# Patient Record
Sex: MALE | Race: WHITE | NOT HISPANIC OR LATINO | Employment: UNEMPLOYED | URBAN - NONMETROPOLITAN AREA
[De-identification: names, ages, dates, MRNs, and addresses within clinical notes are randomized per-mention and may not be internally consistent; named-entity substitution may affect disease eponyms.]

---

## 2022-10-02 ENCOUNTER — HOSPITAL ENCOUNTER (INPATIENT)
Facility: HOSPITAL | Age: 42
LOS: 8 days | Discharge: HOME | End: 2022-10-10
Attending: PSYCHIATRY & NEUROLOGY | Admitting: PSYCHIATRY & NEUROLOGY

## 2022-10-02 ENCOUNTER — HOSPITAL ENCOUNTER (EMERGENCY)
Facility: HOSPITAL | Age: 42
Discharge: PSYCHIATRIC OR SUBSTANCE USE FACILITY | End: 2022-10-02
Attending: EMERGENCY MEDICINE | Admitting: EMERGENCY MEDICINE

## 2022-10-02 VITALS
DIASTOLIC BLOOD PRESSURE: 70 MMHG | WEIGHT: 185 LBS | SYSTOLIC BLOOD PRESSURE: 112 MMHG | RESPIRATION RATE: 18 BRPM | HEART RATE: 90 BPM | BODY MASS INDEX: 25.06 KG/M2 | OXYGEN SATURATION: 99 % | TEMPERATURE: 97.8 F | HEIGHT: 72 IN

## 2022-10-02 DIAGNOSIS — R45.89 SUICIDAL BEHAVIOR WITHOUT ATTEMPTED SELF-INJURY: Primary | ICD-10-CM

## 2022-10-02 PROBLEM — R45.851 DEPRESSION WITH SUICIDAL IDEATION: Status: ACTIVE | Noted: 2022-10-02

## 2022-10-02 PROBLEM — F32.A DEPRESSION WITH SUICIDAL IDEATION: Status: ACTIVE | Noted: 2022-10-02

## 2022-10-02 LAB
ALBUMIN SERPL-MCNC: 3.95 G/DL (ref 3.5–5.2)
ALBUMIN/GLOB SERPL: 1.5 G/DL
ALP SERPL-CCNC: 55 U/L (ref 39–117)
ALT SERPL W P-5'-P-CCNC: 97 U/L (ref 1–41)
AMPHET+METHAMPHET UR QL: NEGATIVE
AMPHETAMINES UR QL: NEGATIVE
ANION GAP SERPL CALCULATED.3IONS-SCNC: 9.5 MMOL/L (ref 5–15)
APAP SERPL-MCNC: <5 MCG/ML (ref 0–30)
AST SERPL-CCNC: 62 U/L (ref 1–40)
BACTERIA UR QL AUTO: ABNORMAL /HPF
BARBITURATES UR QL SCN: NEGATIVE
BASOPHILS # BLD AUTO: 0.09 10*3/MM3 (ref 0–0.2)
BASOPHILS NFR BLD AUTO: 0.7 % (ref 0–1.5)
BENZODIAZ UR QL SCN: NEGATIVE
BILIRUB SERPL-MCNC: <0.2 MG/DL (ref 0–1.2)
BILIRUB UR QL STRIP: NEGATIVE
BUN SERPL-MCNC: 21 MG/DL (ref 6–20)
BUN/CREAT SERPL: 24.4 (ref 7–25)
BUPRENORPHINE SERPL-MCNC: NEGATIVE NG/ML
CALCIUM SPEC-SCNC: 8.7 MG/DL (ref 8.6–10.5)
CANNABINOIDS SERPL QL: NEGATIVE
CHLORIDE SERPL-SCNC: 104 MMOL/L (ref 98–107)
CLARITY UR: CLEAR
CO2 SERPL-SCNC: 25.5 MMOL/L (ref 22–29)
COCAINE UR QL: NEGATIVE
COLOR UR: YELLOW
CREAT SERPL-MCNC: 0.86 MG/DL (ref 0.76–1.27)
DEPRECATED RDW RBC AUTO: 52.6 FL (ref 37–54)
EGFRCR SERPLBLD CKD-EPI 2021: 110.2 ML/MIN/1.73
EOSINOPHIL # BLD AUTO: 0.17 10*3/MM3 (ref 0–0.4)
EOSINOPHIL NFR BLD AUTO: 1.3 % (ref 0.3–6.2)
ERYTHROCYTE [DISTWIDTH] IN BLOOD BY AUTOMATED COUNT: 15.1 % (ref 12.3–15.4)
ETHANOL BLD-MCNC: <10 MG/DL (ref 0–10)
ETHANOL UR QL: <0.01 %
FLUAV RNA RESP QL NAA+PROBE: NOT DETECTED
FLUBV RNA RESP QL NAA+PROBE: NOT DETECTED
GLOBULIN UR ELPH-MCNC: 2.6 GM/DL
GLUCOSE SERPL-MCNC: 95 MG/DL (ref 65–99)
GLUCOSE UR STRIP-MCNC: NEGATIVE MG/DL
HCT VFR BLD AUTO: 40.2 % (ref 37.5–51)
HGB BLD-MCNC: 12.8 G/DL (ref 13–17.7)
HGB UR QL STRIP.AUTO: ABNORMAL
HYALINE CASTS UR QL AUTO: ABNORMAL /LPF
IMM GRANULOCYTES # BLD AUTO: 0.34 10*3/MM3 (ref 0–0.05)
IMM GRANULOCYTES NFR BLD AUTO: 2.7 % (ref 0–0.5)
KETONES UR QL STRIP: NEGATIVE
LEUKOCYTE ESTERASE UR QL STRIP.AUTO: NEGATIVE
LYMPHOCYTES # BLD AUTO: 3.56 10*3/MM3 (ref 0.7–3.1)
LYMPHOCYTES NFR BLD AUTO: 28 % (ref 19.6–45.3)
MAGNESIUM SERPL-MCNC: 2.2 MG/DL (ref 1.6–2.6)
MCH RBC QN AUTO: 30.8 PG (ref 26.6–33)
MCHC RBC AUTO-ENTMCNC: 31.8 G/DL (ref 31.5–35.7)
MCV RBC AUTO: 96.9 FL (ref 79–97)
METHADONE UR QL SCN: NEGATIVE
MONOCYTES # BLD AUTO: 0.99 10*3/MM3 (ref 0.1–0.9)
MONOCYTES NFR BLD AUTO: 7.8 % (ref 5–12)
NEUTROPHILS NFR BLD AUTO: 59.5 % (ref 42.7–76)
NEUTROPHILS NFR BLD AUTO: 7.56 10*3/MM3 (ref 1.7–7)
NITRITE UR QL STRIP: NEGATIVE
NRBC BLD AUTO-RTO: 0 /100 WBC (ref 0–0.2)
OPIATES UR QL: NEGATIVE
OXYCODONE UR QL SCN: NEGATIVE
PCP UR QL SCN: NEGATIVE
PH UR STRIP.AUTO: 7 [PH] (ref 5–8)
PLATELET # BLD AUTO: 297 10*3/MM3 (ref 140–450)
PMV BLD AUTO: 9.1 FL (ref 6–12)
POTASSIUM SERPL-SCNC: 4.5 MMOL/L (ref 3.5–5.2)
PROPOXYPH UR QL: NEGATIVE
PROT SERPL-MCNC: 6.5 G/DL (ref 6–8.5)
PROT UR QL STRIP: NEGATIVE
RBC # BLD AUTO: 4.15 10*6/MM3 (ref 4.14–5.8)
RBC # UR STRIP: ABNORMAL /HPF
REF LAB TEST METHOD: ABNORMAL
SALICYLATES SERPL-MCNC: 2.2 MG/DL
SARS-COV-2 RNA RESP QL NAA+PROBE: NOT DETECTED
SODIUM SERPL-SCNC: 139 MMOL/L (ref 136–145)
SP GR UR STRIP: 1.01 (ref 1–1.03)
SQUAMOUS #/AREA URNS HPF: ABNORMAL /HPF
TRICYCLICS UR QL SCN: NEGATIVE
UROBILINOGEN UR QL STRIP: ABNORMAL
WBC # UR STRIP: ABNORMAL /HPF
WBC NRBC COR # BLD: 12.71 10*3/MM3 (ref 3.4–10.8)

## 2022-10-02 PROCEDURE — C9803 HOPD COVID-19 SPEC COLLECT: HCPCS

## 2022-10-02 PROCEDURE — 36415 COLL VENOUS BLD VENIPUNCTURE: CPT

## 2022-10-02 PROCEDURE — 82077 ASSAY SPEC XCP UR&BREATH IA: CPT | Performed by: EMERGENCY MEDICINE

## 2022-10-02 PROCEDURE — 85025 COMPLETE CBC W/AUTO DIFF WBC: CPT | Performed by: EMERGENCY MEDICINE

## 2022-10-02 PROCEDURE — 80143 DRUG ASSAY ACETAMINOPHEN: CPT | Performed by: EMERGENCY MEDICINE

## 2022-10-02 PROCEDURE — 83735 ASSAY OF MAGNESIUM: CPT | Performed by: EMERGENCY MEDICINE

## 2022-10-02 PROCEDURE — 80306 DRUG TEST PRSMV INSTRMNT: CPT | Performed by: EMERGENCY MEDICINE

## 2022-10-02 PROCEDURE — 81001 URINALYSIS AUTO W/SCOPE: CPT | Performed by: EMERGENCY MEDICINE

## 2022-10-02 PROCEDURE — 87636 SARSCOV2 & INF A&B AMP PRB: CPT | Performed by: EMERGENCY MEDICINE

## 2022-10-02 PROCEDURE — 93005 ELECTROCARDIOGRAM TRACING: CPT | Performed by: PSYCHIATRY & NEUROLOGY

## 2022-10-02 PROCEDURE — 99284 EMERGENCY DEPT VISIT MOD MDM: CPT

## 2022-10-02 PROCEDURE — 80179 DRUG ASSAY SALICYLATE: CPT | Performed by: EMERGENCY MEDICINE

## 2022-10-02 PROCEDURE — 80053 COMPREHEN METABOLIC PANEL: CPT | Performed by: EMERGENCY MEDICINE

## 2022-10-02 RX ORDER — BENZTROPINE MESYLATE 1 MG/1
2 TABLET ORAL ONCE AS NEEDED
Status: DISCONTINUED | OUTPATIENT
Start: 2022-10-02 | End: 2022-10-10 | Stop reason: HOSPADM

## 2022-10-02 RX ORDER — ONDANSETRON 4 MG/1
4 TABLET, FILM COATED ORAL EVERY 6 HOURS PRN
Status: DISCONTINUED | OUTPATIENT
Start: 2022-10-02 | End: 2022-10-10 | Stop reason: HOSPADM

## 2022-10-02 RX ORDER — LOPERAMIDE HYDROCHLORIDE 2 MG/1
2 CAPSULE ORAL
Status: DISCONTINUED | OUTPATIENT
Start: 2022-10-02 | End: 2022-10-10 | Stop reason: HOSPADM

## 2022-10-02 RX ORDER — BENZTROPINE MESYLATE 1 MG/ML
1 INJECTION INTRAMUSCULAR; INTRAVENOUS ONCE AS NEEDED
Status: DISCONTINUED | OUTPATIENT
Start: 2022-10-02 | End: 2022-10-10 | Stop reason: HOSPADM

## 2022-10-02 RX ORDER — TRAZODONE HYDROCHLORIDE 50 MG/1
50 TABLET ORAL NIGHTLY PRN
Status: DISCONTINUED | OUTPATIENT
Start: 2022-10-02 | End: 2022-10-03

## 2022-10-02 RX ORDER — HYDROXYZINE 50 MG/1
50 TABLET, FILM COATED ORAL EVERY 6 HOURS PRN
Status: DISCONTINUED | OUTPATIENT
Start: 2022-10-02 | End: 2022-10-10 | Stop reason: HOSPADM

## 2022-10-02 RX ORDER — NICOTINE 21 MG/24HR
1 PATCH, TRANSDERMAL 24 HOURS TRANSDERMAL
Status: DISCONTINUED | OUTPATIENT
Start: 2022-10-03 | End: 2022-10-10 | Stop reason: HOSPADM

## 2022-10-02 RX ORDER — ECHINACEA PURPUREA EXTRACT 125 MG
2 TABLET ORAL AS NEEDED
Status: DISCONTINUED | OUTPATIENT
Start: 2022-10-02 | End: 2022-10-10 | Stop reason: HOSPADM

## 2022-10-02 RX ORDER — ALUMINA, MAGNESIA, AND SIMETHICONE 2400; 2400; 240 MG/30ML; MG/30ML; MG/30ML
15 SUSPENSION ORAL EVERY 6 HOURS PRN
Status: DISCONTINUED | OUTPATIENT
Start: 2022-10-02 | End: 2022-10-10 | Stop reason: HOSPADM

## 2022-10-02 RX ORDER — BENZONATATE 100 MG/1
100 CAPSULE ORAL 3 TIMES DAILY PRN
Status: DISCONTINUED | OUTPATIENT
Start: 2022-10-02 | End: 2022-10-10 | Stop reason: HOSPADM

## 2022-10-02 RX ORDER — FAMOTIDINE 20 MG/1
20 TABLET, FILM COATED ORAL 2 TIMES DAILY PRN
Status: DISCONTINUED | OUTPATIENT
Start: 2022-10-02 | End: 2022-10-10 | Stop reason: HOSPADM

## 2022-10-02 RX ORDER — IBUPROFEN 400 MG/1
400 TABLET ORAL EVERY 6 HOURS PRN
Status: DISCONTINUED | OUTPATIENT
Start: 2022-10-02 | End: 2022-10-10 | Stop reason: HOSPADM

## 2022-10-03 LAB
ALBUMIN SERPL-MCNC: 3.51 G/DL (ref 3.5–5.2)
ALBUMIN/GLOB SERPL: 1.4 G/DL
ALP SERPL-CCNC: 52 U/L (ref 39–117)
ALT SERPL W P-5'-P-CCNC: 89 U/L (ref 1–41)
ANION GAP SERPL CALCULATED.3IONS-SCNC: 10.2 MMOL/L (ref 5–15)
AST SERPL-CCNC: 57 U/L (ref 1–40)
BASOPHILS # BLD AUTO: 0.08 10*3/MM3 (ref 0–0.2)
BASOPHILS NFR BLD AUTO: 0.7 % (ref 0–1.5)
BILIRUB SERPL-MCNC: <0.2 MG/DL (ref 0–1.2)
BUN SERPL-MCNC: 22 MG/DL (ref 6–20)
BUN/CREAT SERPL: 22.9 (ref 7–25)
CALCIUM SPEC-SCNC: 8.8 MG/DL (ref 8.6–10.5)
CHLORIDE SERPL-SCNC: 106 MMOL/L (ref 98–107)
CO2 SERPL-SCNC: 23.8 MMOL/L (ref 22–29)
CREAT SERPL-MCNC: 0.96 MG/DL (ref 0.76–1.27)
DEPRECATED RDW RBC AUTO: 54.9 FL (ref 37–54)
EGFRCR SERPLBLD CKD-EPI 2021: 100.6 ML/MIN/1.73
EOSINOPHIL # BLD AUTO: 0.19 10*3/MM3 (ref 0–0.4)
EOSINOPHIL NFR BLD AUTO: 1.6 % (ref 0.3–6.2)
ERYTHROCYTE [DISTWIDTH] IN BLOOD BY AUTOMATED COUNT: 15.4 % (ref 12.3–15.4)
GLOBULIN UR ELPH-MCNC: 2.6 GM/DL
GLUCOSE SERPL-MCNC: 108 MG/DL (ref 65–99)
HCT VFR BLD AUTO: 40.1 % (ref 37.5–51)
HGB BLD-MCNC: 12.5 G/DL (ref 13–17.7)
IMM GRANULOCYTES # BLD AUTO: 0.28 10*3/MM3 (ref 0–0.05)
IMM GRANULOCYTES NFR BLD AUTO: 2.4 % (ref 0–0.5)
LYMPHOCYTES # BLD AUTO: 3.56 10*3/MM3 (ref 0.7–3.1)
LYMPHOCYTES NFR BLD AUTO: 30 % (ref 19.6–45.3)
MCH RBC QN AUTO: 30.8 PG (ref 26.6–33)
MCHC RBC AUTO-ENTMCNC: 31.2 G/DL (ref 31.5–35.7)
MCV RBC AUTO: 98.8 FL (ref 79–97)
MONOCYTES # BLD AUTO: 1.07 10*3/MM3 (ref 0.1–0.9)
MONOCYTES NFR BLD AUTO: 9 % (ref 5–12)
NEUTROPHILS NFR BLD AUTO: 56.3 % (ref 42.7–76)
NEUTROPHILS NFR BLD AUTO: 6.7 10*3/MM3 (ref 1.7–7)
NRBC BLD AUTO-RTO: 0 /100 WBC (ref 0–0.2)
PLATELET # BLD AUTO: 282 10*3/MM3 (ref 140–450)
PMV BLD AUTO: 9.4 FL (ref 6–12)
POTASSIUM SERPL-SCNC: 4.5 MMOL/L (ref 3.5–5.2)
PROT SERPL-MCNC: 6.1 G/DL (ref 6–8.5)
RBC # BLD AUTO: 4.06 10*6/MM3 (ref 4.14–5.8)
SODIUM SERPL-SCNC: 140 MMOL/L (ref 136–145)
WBC NRBC COR # BLD: 11.88 10*3/MM3 (ref 3.4–10.8)

## 2022-10-03 PROCEDURE — 80053 COMPREHEN METABOLIC PANEL: CPT | Performed by: PSYCHIATRY & NEUROLOGY

## 2022-10-03 PROCEDURE — 99223 1ST HOSP IP/OBS HIGH 75: CPT | Performed by: PSYCHIATRY & NEUROLOGY

## 2022-10-03 PROCEDURE — 85025 COMPLETE CBC W/AUTO DIFF WBC: CPT | Performed by: PSYCHIATRY & NEUROLOGY

## 2022-10-03 PROCEDURE — 93010 ELECTROCARDIOGRAM REPORT: CPT | Performed by: INTERNAL MEDICINE

## 2022-10-03 RX ORDER — TRAZODONE HYDROCHLORIDE 50 MG/1
100 TABLET ORAL NIGHTLY PRN
Status: DISCONTINUED | OUTPATIENT
Start: 2022-10-03 | End: 2022-10-05

## 2022-10-03 RX ORDER — LURASIDONE HYDROCHLORIDE 40 MG/1
40 TABLET, FILM COATED ORAL
Status: DISCONTINUED | OUTPATIENT
Start: 2022-10-03 | End: 2022-10-10 | Stop reason: HOSPADM

## 2022-10-03 RX ADMIN — HYDROXYZINE HYDROCHLORIDE 50 MG: 50 TABLET, FILM COATED ORAL at 00:17

## 2022-10-03 RX ADMIN — LURASIDONE HYDROCHLORIDE 40 MG: 40 TABLET, FILM COATED ORAL at 17:01

## 2022-10-03 RX ADMIN — TRAZODONE HYDROCHLORIDE 50 MG: 50 TABLET ORAL at 00:17

## 2022-10-03 RX ADMIN — TRAZODONE HYDROCHLORIDE 100 MG: 50 TABLET ORAL at 21:43

## 2022-10-03 RX ADMIN — NICOTINE TRANSDERMAL SYSTEM 1 PATCH: 21 PATCH, EXTENDED RELEASE TRANSDERMAL at 10:27
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
## 2022-10-03 NOTE — PLAN OF CARE
Problem: Adult Behavioral Health Plan of Care  Goal: Plan of Care Review  Outcome: Ongoing, Progressing  Flowsheets  Taken 10/3/2022 1338 by Justin Vazquez, RN  Progress: improving  Outcome Evaluation: PATIENT VERBALIZES SEVERE ANXIETY AND DEPRESSION, SI WITH NO PLAN. PATIENT IS AOX3 AND COOPERATIVE WITH NO S/S OF ACUTE DISTRESS NOTED. PATIENT IS EATING AND DRINKING WELL, POOR SLEEP.  Taken 10/3/2022 1020 by Niurka Fonseca  Plan of Care Reviewed With: patient  Patient Agreement with Plan of Care: agrees  Taken 10/3/2022 0847 by Justin Vazquez, RN  Plan of Care Reviewed With: patient  Patient Agreement with Plan of Care: agrees   Goal Outcome Evaluation:  Plan of Care Reviewed With: patient  Patient Agreement with Plan of Care: agrees     Progress: improving  Outcome Evaluation: PATIENT VERBALIZES SEVERE ANXIETY AND DEPRESSION, SI WITH NO PLAN. PATIENT IS AOX3 AND COOPERATIVE WITH NO S/S OF ACUTE DISTRESS NOTED. PATIENT IS EATING AND DRINKING WELL, POOR SLEEP.

## 2022-10-03 NOTE — NURSING NOTE
Presented pt to Dr. De Leon new order to admit SP3 routine orders to AE    Repeat CBC, and CMP in AM

## 2022-10-03 NOTE — PLAN OF CARE
Problem: Adult Behavioral Health Plan of Care  Goal: Plan of Care Review  Outcome: Ongoing, Not Progressing  Flowsheets (Taken 10/3/2022 0013)  Progress: no change  Plan of Care Reviewed With: patient  Patient Agreement with Plan of Care: agrees  Outcome Evaluation: new admit   Goal Outcome Evaluation:  Plan of Care Reviewed With: patient  Patient Agreement with Plan of Care: agrees     Progress: no change  Outcome Evaluation: new admit
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
## 2022-10-03 NOTE — H&P
"                                                        Psychiatry Inpatient Initial Eval (H+P)    Clinician: Chinmay Nation MD      CC:SI    HPI:   Patient was admitted on the unit on 10/2/2022 and was evaluated on 10/03/22   42 y.o. male presented to Ed reporting worsening symptoms of depression and anxiety. Pt states he has nothing to live for that ever since he got out of nursing home that none of his family will help him. He states that he is suicidal \"any way he can do it.\" he has had thoughts to shoot himself, run in front of traffic, cut himself, and shoot up drugs.\" Pt rates anxiety 10/10, and depression 10/10. Denies HI/AVH.    Available medical/psychiatric records reviewed and incorporated into the current document.   Patient present with a high degree of irritability, dysphoria, and depressed mood.        Past Psychiatric History: X1 prior admission at the [facility] in 2013 according to patient's self report.  He reports multiple other admission at outside facilties, including [facility].    Denies current outpatient care.  Screening questions reveal a h/o episodes of increased energy, possible euphoria, decreased need for sleep.    Substance Abuse History: h/o alcohol, xanax, MJ.  Patient doesn't want to elaborate. He says he has not used recently.    Social History:  Currently homeless. Unemployed.  Grew up in [place], [place].  Currently estranged from family.  Recently released from nursing home after 8.5 years.     Family History:  Patient doesn't want to elaborate, other than to state \"it [mental illness] runs on both sides of my family.\"    No Known Allergies     Past Medical History:   Diagnosis Date   • Anxiety    • Bipolar disorder (HCC)    • Depression    • Hepatitis C        Prior to Admission medications    Not on File        Temp:  [97.6 °F (36.4 °C)-98.2 °F (36.8 °C)] 98.2 °F (36.8 °C)  Heart Rate:  [88-97] 88  Resp:  [18] 18  BP: ()/(59-70) 96/59      :  Mental Status Exam  Mood: " dysphoric  Affect: dysphoric   Thought Processes: linear, logical, and goal directed  Thought Content: negativistic  Hallucinations: no  Suicidal Thoughts: slight  Suicidal Plan/Intent:denies  Hopelesness:Moderate  Homicidal Thoughts:  absent  Review of Systems    General ROS: negative for - fever or malaise  Endocrine ROS: negative for - palpitations  Respiratory ROS: no cough, shortness of breath, or wheezing  Cardiovascular ROS: no chest pain or dyspnea on exertion  Gastrointestinal ROS: no abdominal pain,no black or bloody stools  Neurologic: no dizziness.  No vision changes.  Denies headache.  Skin: denies rash or lesions.  Neuro: negative    Physical Exam:  General Appearance:    Alert, cooperative, in no acute distress   Head:    Normocephalic, without obvious abnormality, atraumatic   Eyes:            Lids and lashes normal, conjunctivae and sclerae normal, no   icterus, no pallor, corneas clear, PERRLA   Ears:    Ears appear intact with no abnormalities noted   Throat:   No oral lesions, no thrush, oral mucosa moist   Neck:   No adenopathy, supple, trachea midline, no thyromegaly, no     carotid bruit, no JVD   Back:     No kyphosis present, no scoliosis present, no skin lesions,       erythema or scars, no tenderness to percussion or                   palpation,   range of motion normal   Lungs:     Clear to auscultation,respirations regular, even and                   unlabored    Heart:    Regular rhythm and normal rate, normal S1 and S2, no            murmur, no gallop, no rub, no click   Breast Exam:    Deferred   Abdomen:     Normal bowel sounds, no masses, no organomegaly, soft, nontender, nondistended, no guarding, no rebound tenderness   Genitalia:    Deferred   Extremities:   Moves all extremities well, no edema, no cyanosis, no              redness   Pulses:   Pulses palpable and equal bilaterally   Skin:   No bleeding, bruising or rash   Lymph nodes:   No palpable adenopathy   Neurologic:    Cranial nerves 2 - 12 grossly intact, sensation intact, DTR        present and equal bilaterally     Exam completed within view of nursing staff.        Labs:  Lab Results (all)     Procedure Component Value Units Date/Time    Comprehensive Metabolic Panel [[id number]]  (Abnormal) Collected: 10/03/22 0445    Specimen: Blood Updated: 10/03/22 0531     Glucose 108 mg/dL      BUN 22 mg/dL      Creatinine 0.96 mg/dL      Sodium 140 mmol/L      Potassium 4.5 mmol/L      Comment: Slight hemolysis detected by analyzer. Results may be affected.        Chloride 106 mmol/L      CO2 23.8 mmol/L      Calcium 8.8 mg/dL      Total Protein 6.1 g/dL      Albumin 3.51 g/dL      ALT (SGPT) 89 U/L      AST (SGOT) 57 U/L      Alkaline Phosphatase 52 U/L      Total Bilirubin <0.2 mg/dL      Globulin 2.6 gm/dL      A/G Ratio 1.4 g/dL      BUN/Creatinine Ratio 22.9     Anion Gap 10.2 mmol/L      eGFR 100.6 mL/min/1.73      Comment: National Kidney Foundation and American Society of Nephrology (ASN) Task Force recommended calculation based on the Chronic Kidney Disease Epidemiology Collaboration (CKD-EPI) equation refit without adjustment for race.       Narrative:      GFR Normal >60  Chronic Kidney Disease <60  Kidney Failure <15      CBC & Differential [[id number]]  (Abnormal) Collected: 10/03/22 0445    Specimen: Blood Updated: 10/03/22 0506    Narrative:      The following orders were created for panel order CBC & Differential.  Procedure                               Abnormality         Status                     ---------                               -----------         ------                     CBC Auto Differential[[id number]]        Abnormal            Final result                 Please view results for these tests on the individual orders.    CBC Auto Differential [[id number]]  (Abnormal) Collected: 10/03/22 0445    Specimen: Blood Updated: 10/03/22 0506     WBC 11.88 10*3/mm3      RBC 4.06 10*6/mm3      Hemoglobin 12.5 g/dL       Hematocrit 40.1 %      MCV 98.8 fL      MCH 30.8 pg      MCHC 31.2 g/dL      RDW 15.4 %      RDW-SD 54.9 fl      MPV 9.4 fL      Platelets 282 10*3/mm3      Neutrophil % 56.3 %      Lymphocyte % 30.0 %      Monocyte % 9.0 %      Eosinophil % 1.6 %      Basophil % 0.7 %      Immature Grans % 2.4 %      Neutrophils, Absolute 6.70 10*3/mm3      Lymphocytes, Absolute 3.56 10*3/mm3      Monocytes, Absolute 1.07 10*3/mm3      Eosinophils, Absolute 0.19 10*3/mm3      Basophils, Absolute 0.08 10*3/mm3      Immature Grans, Absolute 0.28 10*3/mm3      nRBC 0.0 /100 WBC           Imaging:  Imaging Results (All)     None            Diagnosis:  Bipolar II Disorder, MRE depressed with mixed features    Given the high risk and/or potentially life-threatening nature of patient's presenting symptoms, patient has been admitted for safety and stabilization and placed on the appropriate level of precautions.  Patient will be assigned a Masters level therapist and encouraged to participate in both individual and group therapy on the unit.  Routine labs have been ordered.    CODE STATUS: Full Code     Start trial of latuda 40mg Daily.      Hepatitis C  - Followup as an outpatient      I have discussed with the patient the risks, benefits, side effects, and treatment alternatives to the patient's psychiatric medications. Patient has also been instructed regarding the risks versus benefits of no treatment and also the fact that treatment does not guarantee results.  Patient voices an understanding of this and accepts the risks associated with the use of this medication.   Patient is instructed to review the medication information packets provided by the pharmacy and to call me with any questions or concerns related to the medication.  Patient agrees to notify all of their prescribers of the recent medication change, and to notify me immediately if prescribed any other medication by another provider, so as to allow me to verify that the  medications I am prescribing do not interfere with the newly prescribed medication.     Further treatment and disposition planning will be developed prospectively.
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
## 2022-10-03 NOTE — PLAN OF CARE
Problem: Adult Behavioral Health Plan of Care  Goal: Plan of Care Review  Outcome: Ongoing, Progressing  Flowsheets (Taken 10/3/2022 1020)  Consent Given to Review Plan with: Declines  Progress: no change  Plan of Care Reviewed With: patient  Patient Agreement with Plan of Care: agrees  Outcome Evaluation: New admit. Initiated social history and integrated summary  Goal: Patient-Specific Goal (Individualization)  Outcome: Ongoing, Progressing  Flowsheets (Taken 10/3/2022 1020)  Patient Personal Strengths:   resilient   resourceful  Patient-Specific Goals (Include Timeframe): Patient will deny SI/HI prior to discharge. Patient will identify 1 healthy coping skill for stress prior to discharge. Patient will consent to appropriate aftercare plan prior to discharge.  Individualized Care Needs: Therapist will offer 1-4 individual sessions, family education, aftercare planning  Anxieties, Fears or Concerns: None identified  Patient Vulnerabilities:   history of unsuccessful treatment   family/relationship conflict   housing insecurity   food insecurity  Goal: Optimized Coping Skills in Response to Life Stressors  Outcome: Ongoing, Progressing  Intervention: Promote Effective Coping Strategies  Flowsheets (Taken 10/3/2022 1020)  Supportive Measures:   active listening utilized   counseling provided  Goal: Develops/Participates in Therapeutic [place] to Support Successful Transition  Outcome: Ongoing, Progressing  Intervention: Mutually Develop Transition Plan  Flowsheets  Taken 10/3/2022 1020  Outpatient/Agency/Support Group Needs:   outpatient medication management   outpatient counseling   outpatient psychiatric care (specify)   case management  Anticipated Discharge Disposition: (to be determined) other (see comments)  Taken 10/3/2022 1017  Discharge Coordination/Progress: Patient has [facility] insurance for discharge planning. Patient refuses to participate in discharge planning this date.  Concerns Comments:  NA  Transportation Anticipated: public transportation  Transportation Concerns: no car  Current Discharge Risk: psychiatric illness  Concerns to be Addressed:   mental health   compliance issue   coping/stress   discharge planning   home safety   suicidal  Readmission Within the Last 30 Days: no previous admission in last 30 days  Patient/Family Anticipated Services at Transition:   outpatient care   mental health services     Patient's Choice of Community Agency(s): to be determined  Patient/Family Anticipates Transition to: (to be determined) other (see comments)  Offered/Gave Vendor List: no   Goal Outcome Evaluation:  Plan of Care Reviewed With: patient  Patient Agreement with Plan of Care: agrees  Consent Given to Review Plan with: Declines  Progress: no change  Outcome Evaluation: New admit. Initiated social history and integrated summary

## 2022-10-03 NOTE — ED TRIAGE NOTES
MEDICAL SCREENING:    Reason for Visit: Suicidal    Patient initially seen in triage.  The patient was advised further evaluation and diagnostic testing will be needed, some of the treatment and testing will be initiated in the lobby in order to begin the process.  The patient will be returned to the waiting area for the time being and possibly be re-assessed by a subsequent ED provider.  The patient will be brought back to the treatment area in as timely manner as possible.

## 2022-10-04 LAB
QT INTERVAL: 344 MS
QTC INTERVAL: 425 MS

## 2022-10-04 PROCEDURE — 99232 SBSQ HOSP IP/OBS MODERATE 35: CPT | Performed by: PSYCHIATRY & NEUROLOGY

## 2022-10-04 RX ADMIN — NICOTINE TRANSDERMAL SYSTEM 1 PATCH: 21 PATCH, EXTENDED RELEASE TRANSDERMAL at 10:16

## 2022-10-04 RX ADMIN — LURASIDONE HYDROCHLORIDE 40 MG: 40 TABLET, FILM COATED ORAL at 17:10

## 2022-10-04 RX ADMIN — HYDROXYZINE HYDROCHLORIDE 50 MG: 50 TABLET, FILM COATED ORAL at 20:59

## 2022-10-04 RX ADMIN — TRAZODONE HYDROCHLORIDE 100 MG: 50 TABLET ORAL at 21:00

## 2022-10-04 NOTE — PROGRESS NOTES
" Inpatient Psych Progress Note     Clinician: Chinmay Nation MD  Admission Date: 10/2/2022  10:51 EDT 10/04/22    Behavioral Health Treatment Plan and Problem List: I have reviewed and approved the Behavioral Health Treatment Plan and Problem list.    Allergies  No Known Allergies    Hospital Day: 2 days      Assessment completed within view of staff    History  CC/clinical focus: SI    Interval HPI: Patient seen and evaluated by me.  Chart reviewed. Patient continues to voice c/o severe depressed mood.  He is quite dysphoric and irritable.  Rates depression at a 8/10.   Med Compliant.  ROS otherwise as below.      Interval Review of Systems:   General ROS: negative for - fever or malaise  Endocrine ROS: negative for - palpitations  Respiratory ROS: no cough, shortness of breath, or wheezing  Cardiovascular ROS: no chest pain or dyspnea on exertion  Gastrointestinal ROS: no abdominal pain,no black or bloody stools    /61 (BP Location: Right arm, Patient Position: Sitting)   Pulse 83   Temp 98 °F (36.7 °C) (Temporal)   Resp 18   Ht 182.9 cm (72\")   Wt 70.5 kg (155 lb 6.4 oz)   SpO2 98%   BMI 21.08 kg/m²     Mental Status Exam  Mood: depressed  Affect: mood-congruent   Thought Processes: linear  Thought Content: negativistic  Hallucinations: no  Suicidal Thoughts: denies  Suicidal Plan/Intent: denies  Hopelesness:Severe  Homicidal Thoughts:  denies      Medical Decision Making:   Labs:     Lab Results (last 24 hours)     ** No results found for the last 24 hours. **            Radiology:     Imaging Results (Last 24 Hours)     ** No results found for the last 24 hours. **            EKG:     ECG/EMG Results (most recent)     Procedure Component Value Units Date/Time    ECG 12 Lead [[id number]] Collected: 10/03/22 0146     Updated: 10/04/22 0911     QT Interval 344 ms      QTC Interval 425 ms     Narrative:      Test Reason : Baseline Cardiac Status  Blood Pressure :   */*   mmHG  Vent. Rate :  92 BPM     " Atrial Rate :  92 BPM     P-R Int : 104 ms          QRS Dur :  80 ms      QT Int : 344 ms       P-R-T Axes :  73  76  77 degrees     QTc Int : 425 ms    Sinus rhythm with short MS  Otherwise normal ECG  When compared with ECG of 03-OCT-2022 01:46, (Unconfirmed)  Previous ECG has undetermined rhythm, needs review  Confirmed by Jagdeep Serrano ([id number]) on 10/4/2022 9:07:29 AM    Referred By: KAREN           Confirmed By: Jagdeep Serrano           Medications:  lurasidone, 40 mg, Oral, Daily With Dinner  nicotine, 1 patch, Transdermal, Q24H           All medications reviewed.      Assessment and Plan:      Bipolar II Disorder, MRE depressed with mixed features   Continue latuda 40mg Daily.        Hepatitis C  - Followup as an outpatient    Elevated LFTs  - Reviewed AM CMP, LFT's trending down.  Outpatient followup.     Continue hospitalization for safety and stabilization.  Continue current level of Special Precautions (q15 minute checks).

## 2022-10-04 NOTE — PLAN OF CARE
"  Problem: Adult Behavioral Health Plan of Care  Goal: Develops/Participates in Therapeutic [place] to Support Successful Transition  Outcome: Ongoing, Progressing  Intervention: Mutually Develop Transition Plan  Flowsheets (Taken 10/4/2022 1005)  Transition Support: follow-up care discussed       1005:     Therapist attempted to meet 1-1 with patient again for session including social history, aftercare planning, safety planning etc. Patient covers his head with his arm and states, \"I don't feel like this. I don't want to talk and I don't know what I'm gonna do.\"  Therapist provided emotional support and educated him about therapist's role. Encouraged him to meet with therapist asap to work on planning.  Patient refused and stated to come back tomorrow. Therapist has staffed with Dr. Chinmay Nation and updated him about patient's non-cooperation.  Patient isolates to room and he has been encouraged to comply with treatment planning and to attend daily groups.   "

## 2022-10-04 NOTE — PLAN OF CARE
Problem: Adult Behavioral Health Plan of Care  Goal: Plan of Care Review  Outcome: Ongoing, Progressing  Flowsheets  Taken 10/4/2022 1411 by Justin Vazquez, RN  Outcome Evaluation: PATIENT IS AOX3, COOPERATIVE WITH NO S/S OF DISTRESS NOTED.  Taken 10/4/2022 0808 by Justin Vazquez, RN  Plan of Care Reviewed With: patient  Patient Agreement with Plan of Care: agrees  Taken 10/4/2022 0433 by Sweetie Wilson RN  Progress: improving   Goal Outcome Evaluation:  Plan of Care Reviewed With: patient  Patient Agreement with Plan of Care: agrees     Progress: improving  Outcome Evaluation: PATIENT IS AOX3, COOPERATIVE WITH NO S/S OF DISTRESS NOTED.

## 2022-10-04 NOTE — PLAN OF CARE
Goal Outcome Evaluation:  Plan of Care Reviewed With: patient  Patient Agreement with Plan of Care: agrees     Progress: improving  Outcome Evaluation: Pt stated he was eating good but was having trouble sleeping thru the night. Pt rated anxiety a 5/10 and depression an 8/10. Pt stated he felt the lowest of the low. Pt had no si or hi or avh.

## 2022-10-04 NOTE — PHARMACY PATIENT ASSISTANCE
Pharmacy checked on price of Latuda 40 mg initiated inpatient. Per patient's plan, copay will be $0 for 1 month supply. No other issues identified at this time.    Thank you,    Nadira Briceño, PharmD  10/04/22  15:25 EDT

## 2022-10-05 PROCEDURE — 99232 SBSQ HOSP IP/OBS MODERATE 35: CPT | Performed by: PSYCHIATRY & NEUROLOGY

## 2022-10-05 RX ORDER — TRAZODONE HYDROCHLORIDE 50 MG/1
150 TABLET ORAL NIGHTLY PRN
Status: DISCONTINUED | OUTPATIENT
Start: 2022-10-05 | End: 2022-10-06

## 2022-10-05 RX ADMIN — IBUPROFEN 400 MG: 400 TABLET, FILM COATED ORAL at 20:37

## 2022-10-05 RX ADMIN — LURASIDONE HYDROCHLORIDE 40 MG: 40 TABLET, FILM COATED ORAL at 17:07

## 2022-10-05 RX ADMIN — HYDROXYZINE HYDROCHLORIDE 50 MG: 50 TABLET, FILM COATED ORAL at 20:37

## 2022-10-05 RX ADMIN — TRAZODONE HYDROCHLORIDE 150 MG: 50 TABLET ORAL at 20:37

## 2022-10-05 NOTE — PROGRESS NOTES
" Inpatient Psych Progress Note     Clinician: Chinmay Nation MD  Admission Date: 10/2/2022  14:10 EDT 10/05/22    Behavioral Health Treatment Plan and Problem List: I have reviewed and approved the Behavioral Health Treatment Plan and Problem list.    Allergies  No Known Allergies    Hospital Day: 3 days      Assessment completed within view of staff    History  CC/clinical focus: SI    Interval HPI: Patient seen and evaluated by me.  Chart reviewed. Patient states \"I am still feeling really hopeless.\"  Depressive symptoms still quite severe.  Tends to externalize blame.  C/o insomnia  Med Compliant.  ROS otherwise as below.      Interval Review of Systems:   General ROS: negative for - fever or malaise  Endocrine ROS: negative for - palpitations  Respiratory ROS: no cough, shortness of breath, or wheezing  Cardiovascular ROS: no chest pain or dyspnea on exertion  Gastrointestinal ROS: no abdominal pain,no black or bloody stools    BP 98/58 (BP Location: Right arm, Patient Position: Sitting)   Pulse 78   Temp 97 °F (36.1 °C) (Temporal)   Resp 18   Ht 182.9 cm (72\")   Wt 70.5 kg (155 lb 6.4 oz)   SpO2 98%   BMI 21.08 kg/m²     Mental Status Exam  Mood: depressed  Affect: dysphoric   Thought Processes: linear  Thought Content: negativistic  Hallucinations: no  Suicidal Thoughts:   Suicidal Plan/Intent: denies  Hopelesness:Moderate  Homicidal Thoughts:  denies      Medical Decision Making:   Labs:     Lab Results (last 24 hours)     ** No results found for the last 24 hours. **            Radiology:     Imaging Results (Last 24 Hours)     ** No results found for the last 24 hours. **            EKG:     ECG/EMG Results (most recent)     Procedure Component Value Units Date/Time    ECG 12 Lead [[id number]] Collected: 10/03/22 0146     Updated: 10/04/22 0911     QT Interval 344 ms      QTC Interval 425 ms     Narrative:      Test Reason : Baseline Cardiac Status  Blood Pressure :   */*   mmHG  Vent. Rate :  92 BPM  "    Atrial Rate :  92 BPM     P-R Int : 104 ms          QRS Dur :  80 ms      QT Int : 344 ms       P-R-T Axes :  73  76  77 degrees     QTc Int : 425 ms    Sinus rhythm with short PA  Otherwise normal ECG  When compared with ECG of 03-OCT-2022 01:46, (Unconfirmed)  Previous ECG has undetermined rhythm, needs review  Confirmed by Jagdeep Serrano ([id number]) on 10/4/2022 9:07:29 AM    Referred By: KAREN           Confirmed By: Jagdeep Serrano           Medications:  lurasidone, 40 mg, Oral, Daily With Dinner  nicotine, 1 patch, Transdermal, Q24H           All medications reviewed.      Assessment and Plan:    Bipolar II Disorder, MRE depressed with mixed features   Continue latuda 40mg Daily.  Continue individual and group therapy on the unit  We are pursuing disposition options.      Insomnia  - Increase trazodone to 150mg and schedule QHS    Hepatitis C  - Followup as an outpatient     Elevated LFTs  - trending down.  Outpatient followup      Continue hospitalization for safety and stabilization.  Continue current level of Special Precautions (q15 minute checks).

## 2022-10-05 NOTE — PLAN OF CARE
Problem: Adult Behavioral Health Plan of Care  Goal: Develops/Participates in Therapeutic [place] to Support Successful Transition  Outcome: Ongoing, Progressing  Intervention: Mutually Develop Transition Plan  Flowsheets (Taken 10/5/2022 1120)  Transition Support:   community resources reviewed   crisis management plan promoted   follow-up care coordinated   crisis management plan verbalized   follow-up care discussed      1110:    DATA:      Therapist met individually with patient this date to introduce role and to discuss hospitalization expectations. Patient agreeable. Reviewed medical record and staffed case with treatment team this date. No major issues identified.       Clinical Maneuvering/Intervention:     Therapist assisted patient in processing above session content; acknowledged and normalized patient’s thoughts, feelings, and concerns.  Discussed the therapist/patient relationship and explain the parameters and limitations of relative confidentiality.  Also discussed the importance of active participation, and honesty to the treatment process.  Encouraged the patient to discuss/vent their feelings, frustrations, and fears concerning their ongoing medical issues and validated their feelings.     Allowed patient to freely discuss issues without interruption or judgment. Provided safe, confidential environment to facilitate the development of positive therapeutic relationship and encourage open, honest communication.      ASSESSMENT:      The patient was seen 1-1 at bedside. Patient continues to cover face with sleeve and minimize need to talk to this therapist. He briefly woke up and explained that he is homeless and does not have IDs or support.  Therapist recommended patient consider sober/transitional living and explained that we can start sending referrals. Patient reports that he is leaning towards either walking from here or going to a shelter. Therapist explained that most shelters are far away  as [place]  Patient verbalized understanding.  Therapist educated patient that if he goes to sober living he can link with case management to get help possibly getting ID and paper work. Patient reports that he would think about it. Patient denies SI/HI/AVH, but rates depression/anxiety at 10/10. He continues to appear somewhat dysphoric and irritable.       PLAN:       Patient to remain hospitalized this date.      Treatment team will focus efforts on stabilizing patient's acute symptoms while providing education on healthy coping and crisis management to reduce hospitalizations.   Patient requires daily psychiatrist evaluation and 24/7 nursing supervision to promote patient  safety.     Therapist will offer 1-4 individual sessions, family education, and appropriate referral.     Therapist recommends sober living referral. Patient is considering options.  Aftercare unresolved this date. Patient refuses family involvement.

## 2022-10-05 NOTE — PLAN OF CARE
Goal Outcome Evaluation:  Plan of Care Reviewed With: patient  Patient Agreement with Plan of Care: agrees     Progress: improving  Outcome Evaluation: Patient calm and cooperative. Rates anxiety and depression both a 5. Denies SI/HI or AVH.

## 2022-10-05 NOTE — PLAN OF CARE
Goal Outcome Evaluation:  Plan of Care Reviewed With: patient  Patient Agreement with Plan of Care: agrees     Progress: no change  Outcome Evaluation: Pt stated his appetite was good but was still having trouble sleeping thru the night. Pt rated anxiety and depression a 10/10. Pt had no si/hi or avh. Pt was up in the dayroomfor snack but back to his room to rest.

## 2022-10-05 NOTE — PLAN OF CARE
Problem: Adult Behavioral Health Plan of Care  Goal: Plan of Care Review  Outcome: Ongoing, Progressing  Flowsheets  Taken 10/5/2022 1436  Progress: improving  Outcome Evaluation: PATIENT VERBALIZES MODERATE ANXIETY AND DEPRESSION AS ONLY PROBLEMS THIS SHIFT. PATIENT IS AOX3 AND COOPERATIVE WITH NO S/S OF ACUTE DISTRESS NOTED.  Taken 10/5/2022 0742  Plan of Care Reviewed With: patient  Patient Agreement with Plan of Care: agrees   Goal Outcome Evaluation:  Plan of Care Reviewed With: patient  Patient Agreement with Plan of Care: agrees     Progress: improving  Outcome Evaluation: PATIENT VERBALIZES MODERATE ANXIETY AND DEPRESSION AS ONLY PROBLEMS THIS SHIFT. PATIENT IS AOX3 AND COOPERATIVE WITH NO S/S OF ACUTE DISTRESS NOTED.

## 2022-10-06 PROCEDURE — 99232 SBSQ HOSP IP/OBS MODERATE 35: CPT | Performed by: PSYCHIATRY & NEUROLOGY

## 2022-10-06 RX ORDER — TRAZODONE HYDROCHLORIDE 50 MG/1
150 TABLET ORAL NIGHTLY
Status: DISCONTINUED | OUTPATIENT
Start: 2022-10-06 | End: 2022-10-07

## 2022-10-06 RX ADMIN — TRAZODONE HYDROCHLORIDE 150 MG: 50 TABLET ORAL at 20:11

## 2022-10-06 RX ADMIN — HYDROXYZINE HYDROCHLORIDE 50 MG: 50 TABLET, FILM COATED ORAL at 20:11

## 2022-10-06 RX ADMIN — LURASIDONE HYDROCHLORIDE 40 MG: 40 TABLET, FILM COATED ORAL at 17:04

## 2022-10-06 NOTE — PLAN OF CARE
Problem: Adult Behavioral Health Plan of Care  Goal: Optimized Coping Skills in Response to Life Stressors  Outcome: Ongoing, Progressing  Intervention: Promote Effective Coping Strategies  Flowsheets (Taken 10/6/2022 1024)  Supportive Measures:   active listening utilized   counseling provided  Goal: Develops/Participates in Therapeutic [place] to Support Successful Transition  Outcome: Ongoing, Progressing  Intervention: Foster Therapeutic [place]  Flowsheets (Taken 10/6/2022 1024)  Trust Relationship/Rapport:   care explained   empathic listening provided   choices provided   questions answered   emotional support provided   questions encouraged   thoughts/feelings acknowledged   reassurance provided  Intervention: Mutually Develop Transition Plan  Flowsheets (Taken 10/5/2022 1120)  Transition Support:   community resources reviewed   crisis management plan promoted   follow-up care coordinated   crisis management plan verbalized   follow-up care discussed       1018:     DATA:      Therapist met individually with patient this date to introduce role and to discuss hospitalization expectations. Patient agreeable. Reviewed medical record and staffed case with treatment team this date. No major issues identified.       Clinical Maneuvering/Intervention:     Therapist assisted patient in processing above session content; acknowledged and normalized patient’s thoughts, feelings, and concerns.  Discussed the therapist/patient relationship and explain the parameters and limitations of relative confidentiality.  Also discussed the importance of active participation, and honesty to the treatment process.  Encouraged the patient to discuss/vent their feelings, frustrations, and fears concerning their ongoing medical issues and validated their feelings.     Allowed patient to freely discuss issues without interruption or judgment. Provided safe, confidential environment to facilitate the development of positive  "therapeutic relationship and encourage open, honest communication.      Therapist addressed discharge safety planning this date. Assisted patient in identifying risk factors which would indicate the need for higher level of care after discharge;  including thoughts to harm self or others and/or self-harming behavior. Encouraged patient to call 911, or present to the nearest emergency room should any of these events occur. Discussed crisis intervention services and means to access.  Encouraged securing any objects of harm.       ASSESSMENT:      The patient was seen 1-1 at bedside. Again, patient is noted to have eyes covered with a sleeve of his shirt. He is irritable with this therapist. \"God d______! It's too early for this.\"  Therapist redirected and again educated patient about the therapist's role in planning. Reviewed Dr. Chinmay Nation's note which appears to indicate that patient is more receptive to sober living.  Patient reports that he has talked to a fellow peer and received recommendation to enter [facility].  Patient signed consent and would not agree to sign for other houses.  Patient reports, \"'I'm trying to stay till next week.  Like next Friday even.\"  Therapist educated about short term hospitalization goals and encouraged patient to attend groups.      Therapist contacted [facility] staff at [phone];they have no male beds open at this time but report that their [place] facility [facility] [phone] does.  This number was provided to patient for phone assessment. Patient responded, \"I ain't trying to get to [place].\"      Patient signed for [facility] [phone]; they do have male bed openings.  Provided number to patient to call this date. Patient receptive.         PLAN:       Patient to remain hospitalized this date.      Treatment team will focus efforts on stabilizing patient's acute symptoms while providing education on healthy coping and crisis management to reduce " hospitalizations.   Patient requires daily psychiatrist evaluation and 24/7 nursing supervision to promote patient  safety.     Therapist will offer 1-4 individual sessions, family education, and appropriate referral.     Therapist recommends sober living/transitional living referral.  Patient signed consent for [facility] referrals.

## 2022-10-06 NOTE — PROGRESS NOTES
" Inpatient Psych Progress Note     Clinician: Chinmay Nation MD  Admission Date: 10/2/2022  09:49 EDT 10/06/22    Behavioral Health Treatment Plan and Problem List: I have reviewed and approved the Behavioral Health Treatment Plan and Problem list.    Allergies  No Known Allergies    Hospital Day: 4 days      Assessment completed within view of staff    History  CC/clinical focus: depression, SI    Interval HPI: Patient seen and evaluated by me.  Chart reviewed.   Depressive Sx starting to improve.  Denies SI this morning.  He has been presented with some options for sober living facilities, and is feeling a little more hopeful after seeing these options.  Med Compliant.  ROS otherwise as below.      Interval Review of Systems:   General ROS: negative for - fever or malaise  Endocrine ROS: negative for - palpitations  Respiratory ROS: no cough, shortness of breath, or wheezing  Cardiovascular ROS: no chest pain or dyspnea on exertion  Gastrointestinal ROS: no abdominal pain,no black or bloody stools    /61 (BP Location: Right arm, Patient Position: Sitting)   Pulse 88   Temp 97.3 °F (36.3 °C) (Temporal)   Resp 18   Ht 182.9 cm (72\")   Wt 70.5 kg (155 lb 6.4 oz)   SpO2 99%   BMI 21.08 kg/m²     Mental Status Exam  Mood: depressed  Affect: mood-congruent   Thought Processes: linear  Thought Content: normal  Hallucinations: no  Suicidal Thoughts: denies  Suicidal Plan/Intent: denies  Hopelesness:Mild  Homicidal Thoughts:  denies      Medical Decision Making:   Labs:     Lab Results (last 24 hours)     ** No results found for the last 24 hours. **            Radiology:     Imaging Results (Last 24 Hours)     ** No results found for the last 24 hours. **            EKG:     ECG/EMG Results (most recent)     Procedure Component Value Units Date/Time    ECG 12 Lead [[id number]] Collected: 10/03/22 0146     Updated: 10/04/22 0911     QT Interval 344 ms      QTC Interval 425 ms     Narrative:      Test Reason : " Baseline Cardiac Status  Blood Pressure :   */*   mmHG  Vent. Rate :  92 BPM     Atrial Rate :  92 BPM     P-R Int : 104 ms          QRS Dur :  80 ms      QT Int : 344 ms       P-R-T Axes :  73  76  77 degrees     QTc Int : 425 ms    Sinus rhythm with short OR  Otherwise normal ECG  When compared with ECG of 03-OCT-2022 01:46, (Unconfirmed)  Previous ECG has undetermined rhythm, needs review  Confirmed by Jagdeep Serrano ([id number]) on 10/4/2022 9:07:29 AM    Referred By: KAREN           Confirmed By: Jagdeep Serrano           Medications:  lurasidone, 40 mg, Oral, Daily With Dinner  nicotine, 1 patch, Transdermal, Q24H           All medications reviewed.      Assessment and Plan:       Bipolar II Disorder, MRE depressed with mixed features   Continue latuda 40mg Daily.  Continue individual and group therapy on the unit  We are pursuing disposition options.   Pursuing sober living facilities.       Insomnia  - Increase trazodone to 150mg and schedule QHS     Hepatitis C  - Followup as an outpatient     Elevated LFTs  - trending down.  Outpatient followup    Continue hospitalization for safety and stabilization.  Continue current level of Special Precautions (q15 minute checks).

## 2022-10-06 NOTE — PLAN OF CARE
Goal Outcome Evaluation:  Plan of Care Reviewed With: patient  Patient Agreement with Plan of Care: agrees        Outcome Evaluation: Patient reports anxiety at 6 and depression at 2.  Denies SI/HI or AVH.  Patient calm and cooperative this shift.

## 2022-10-06 NOTE — PLAN OF CARE
Goal Outcome Evaluation:  Plan of Care Reviewed With: patient  Patient Agreement with Plan of Care: agrees     Progress: no change  Outcome Evaluation: Patient out of room some today, did not participate in all groups.  Anxiety 4, depression 2, denies S/I.

## 2022-10-07 PROCEDURE — 99232 SBSQ HOSP IP/OBS MODERATE 35: CPT | Performed by: PSYCHIATRY & NEUROLOGY

## 2022-10-07 RX ORDER — DOXEPIN HYDROCHLORIDE 25 MG/1
25 CAPSULE ORAL NIGHTLY
Status: DISCONTINUED | OUTPATIENT
Start: 2022-10-07 | End: 2022-10-08

## 2022-10-07 RX ADMIN — LURASIDONE HYDROCHLORIDE 40 MG: 40 TABLET, FILM COATED ORAL at 17:00

## 2022-10-07 RX ADMIN — DOXEPIN HYDROCHLORIDE 25 MG: 25 CAPSULE ORAL at 22:45
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
## 2022-10-07 NOTE — PLAN OF CARE
Goal Outcome Evaluation:  Plan of Care Reviewed With: patient  Patient Agreement with Plan of Care: agrees        Outcome Evaluation: Patient rates anxiety 5 depression 4. Patient is calm and cooperative with staff. Pt continue to isolate self from peers  Pt has no complaints on this shift. Pt has been educated on COVID-19. Teaching includes washing hands, not touching face, and social distancing.

## 2022-10-07 NOTE — PROGRESS NOTES
1457:     Patient returned to therapist's office, more cooperative. He talked to male peer and now wants to go to sober living. Signed for [facility] and talked to Lewis this date. Lewis reports that patient is likely appropriate and he can call Monday for bed date. [address] [facility]  [phone]

## 2022-10-07 NOTE — PLAN OF CARE
Goal Outcome Evaluation:  Plan of Care Reviewed With: patient  Patient Agreement with Plan of Care: agrees        Outcome Evaluation: Patient rated anxiety 5/10, depresssion 3/10.  Denied HI, SI, AVH. Was calm and cooperative.

## 2022-10-07 NOTE — PROGRESS NOTES
" Inpatient Psych Progress Note     Clinician: Chinmay Nation MD  Admission Date: 10/2/2022  10:29 EDT 10/07/22    Behavioral Health Treatment Plan and Problem List: I have reviewed and approved the Behavioral Health Treatment Plan and Problem list.    Allergies  No Known Allergies    Hospital Day: 5 days      Assessment completed within view of staff    History  CC/clinical focus: depression, SI    Interval HPI: Patient seen and evaluated by me.  Chart reviewed. C/o insomnia. Patient rates  level of depression (subjectively) at a  5 /10.  Anxiety   5/10.  Patient tolerating meds okay.  Denies side effects.  Tolerating meds okay.  He is looking into options for sober living.  Med Compliant.  ROS otherwise as below.      Interval Review of Systems:   General ROS: negative for - fever or malaise  Endocrine ROS: negative for - palpitations  Respiratory ROS: no cough, shortness of breath, or wheezing  Cardiovascular ROS: no chest pain or dyspnea on exertion  Gastrointestinal ROS: no abdominal pain,no black or bloody stools    /58 (BP Location: Right arm, Patient Position: Sitting)   Pulse 78   Temp 97.2 °F (36.2 °C) (Temporal)   Resp 18   Ht 182.9 cm (72\")   Wt 70.5 kg (155 lb 6.4 oz)   SpO2 98%   BMI 21.08 kg/m²     Mental Status Exam  Mood: depressed  Affect: mood-congruent   Thought Processes: linear  Thought Content: normal  Hallucinations: no  Suicidal Thoughts: denies  Suicidal Plan/Intent: denies  Hopelesness:Moderate  Homicidal Thoughts:  denies      Medical Decision Making:   Labs:     Lab Results (last 24 hours)     ** No results found for the last 24 hours. **            Radiology:     Imaging Results (Last 24 Hours)     ** No results found for the last 24 hours. **            EKG:     ECG/EMG Results (most recent)     Procedure Component Value Units Date/Time    ECG 12 Lead [[id number]] Collected: 10/03/22 0146     Updated: 10/04/22 0911     QT Interval 344 ms      QTC Interval 425 ms     " Narrative:      Test Reason : Baseline Cardiac Status  Blood Pressure :   */*   mmHG  Vent. Rate :  92 BPM     Atrial Rate :  92 BPM     P-R Int : 104 ms          QRS Dur :  80 ms      QT Int : 344 ms       P-R-T Axes :  73  76  77 degrees     QTc Int : 425 ms    Sinus rhythm with short TN  Otherwise normal ECG  When compared with ECG of 03-OCT-2022 01:46, (Unconfirmed)  Previous ECG has undetermined rhythm, needs review  Confirmed by Jagdeep Serrano (2020) on 10/4/2022 9:07:29 AM    Referred By: KAREN           Confirmed By: Jagdeep Serrano           Medications:  lurasidone, 40 mg, Oral, Daily With Dinner  nicotine, 1 patch, Transdermal, Q24H  traZODone, 150 mg, Oral, Nightly           All medications reviewed.      Assessment and Plan:    Bipolar II Disorder, MRE depressed with mixed features   Continue latuda 40mg Daily.  Continue individual and group therapy on the unit  We are pursuing disposition options.   Pursuing sober living facilities.  Aim to discharge early next week.       Insomnia  - Stop trazodone and start trial of doxepin 25mg QHS     Hepatitis C  - Followup as an outpatient     Elevated LFTs  - trending down.  Outpatient followup      Continue hospitalization for safety and stabilization.  Continue current level of Special Precautions (q15 minute checks).

## 2022-10-07 NOTE — DISCHARGE INSTR - APPOINTMENTS
[facility]  [email or web address]  [address], [place] [address] · ~[address].2 [address]  [phone]    Wednesday October 12th at 10:00am.

## 2022-10-07 NOTE — PLAN OF CARE
Problem: Adult Behavioral Health Plan of Care  Goal: Optimized Coping Skills in Response to Life Stressors  Outcome: Ongoing, Progressing  Intervention: Promote Effective Coping Strategies  Flowsheets (Taken 10/7/2022 1333)  Supportive Measures:  • active listening utilized  • counseling provided  Goal: Develops/Participates in Therapeutic Aguilar to Support Successful Transition  Outcome: Ongoing, Progressing  Intervention: Foster Therapeutic Aguilar  Flowsheets (Taken 10/6/2022 1024)  Trust Relationship/Rapport:  • care explained  • empathic listening provided  • choices provided  • questions answered  • emotional support provided  • questions encouraged  • thoughts/feelings acknowledged  • reassurance provided       1300:     DATA:      Therapist met individually with patient this date to introduce role and to discuss hospitalization expectations. Patient agreeable. Reviewed medical record and staffed case with Dr. Chinmay Nation and treatment team this date. No major issues identified.       Clinical Maneuvering/Intervention:     Therapist assisted patient in processing above session content; acknowledged and normalized patient’s thoughts, feelings, and concerns.  Discussed the therapist/patient relationship and explain the parameters and limitations of relative confidentiality.  Also discussed the importance of active participation, and honesty to the treatment process.  Encouraged the patient to discuss/vent their feelings, frustrations, and fears concerning their ongoing medical issues and validated their feelings.     Allowed patient to freely discuss issues without interruption or judgment. Provided safe, confidential environment to facilitate the development of positive therapeutic relationship and encourage open, honest communication.      Therapist addressed discharge safety planning this date. Assisted patient in identifying risk factors which would indicate the need for higher level of care after  "discharge;  including thoughts to harm self or others and/or self-harming behavior. Encouraged patient to call 911, or present to the nearest emergency room should any of these events occur. Discussed crisis intervention services and means to access.  Encouraged securing any objects of harm.       ASSESSMENT:      The patient was seen 1-1 at bedside.  Patient noted to be calm, resting in bed. Patient has appropriate affect and mood.  Therapist continues to provide emotional support and encouragement for patient to work on his plan. Patient reports that he is a hot head and that he does not like \"rules and shit.\" He's talked to a female peer who says that the sober living in [facility] has too many rules and he won't like it.  Therapist encourage patient to talk to these places himself before making a decision.  Patient reports that he's trying to get to [facility] or [place].  Therapist encouraged patient to seek sober living or closest shelter because he will be dependent on public transport.  Patient curses at times, states that he's a \"hot head\" and that he would rather be in [place] [facility] because they don't rush you and you can stay.  Patient routinely appears irritable towards this therapist, usually because of discussion of aftercare planning. Reviewed that treatment team is not rushing patient, but that part of planning is to work on aftercare planning.  Patient minimally to education and talks over therapist.  He reports that he wants to stay in [place] or [place], but admits that he does not have an address to go to.  Therapist recommended signing consent for at least [facility] follow up so that he will have a place to get refills.  Patient reports that he won't follow up with \"crap like that.\"  Patient has been made aware that there are sober living houses in [place] that have openings at this time. He was offered the office and to come with therapist to research different places and to work on his " plan. Patient declines. He has been provided the number to [facility] in [facility] and encouraged to call.       PLAN:       Patient to remain hospitalized this date.      Treatment team will focus efforts on stabilizing patient's acute symptoms while providing education on healthy coping and crisis management to reduce hospitalizations.   Patient requires daily psychiatrist evaluation and 24/7 nursing supervision to promote patient  safety.     Therapist will offer 1-4 individual sessions, family education, and appropriate referral.     Therapist recommends sober living referral.  Patient will not cooperate with planning.  He will not call houses to work on admission. He will not sign for [facility] referral.  He will not sign for safety planning with family or friend.

## 2022-10-08 PROCEDURE — 99232 SBSQ HOSP IP/OBS MODERATE 35: CPT | Performed by: PSYCHIATRY & NEUROLOGY

## 2022-10-08 RX ORDER — TRAZODONE HYDROCHLORIDE 50 MG/1
200 TABLET ORAL NIGHTLY
Status: DISCONTINUED | OUTPATIENT
Start: 2022-10-08 | End: 2022-10-09

## 2022-10-08 RX ADMIN — LURASIDONE HYDROCHLORIDE 40 MG: 40 TABLET, FILM COATED ORAL at 17:54

## 2022-10-08 RX ADMIN — TRAZODONE HYDROCHLORIDE 200 MG: 50 TABLET ORAL at 21:34

## 2022-10-08 RX ADMIN — HYDROXYZINE HYDROCHLORIDE 50 MG: 50 TABLET, FILM COATED ORAL at 21:34

## 2022-10-08 RX ADMIN — NICOTINE TRANSDERMAL SYSTEM 1 PATCH: 21 PATCH, EXTENDED RELEASE TRANSDERMAL at 22:13

## 2022-10-08 RX ADMIN — HYDROXYZINE HYDROCHLORIDE 50 MG: 50 TABLET, FILM COATED ORAL at 11:04

## 2022-10-08 NOTE — PLAN OF CARE
Goal Outcome Evaluation:  Plan of Care Reviewed With: patient  Patient Agreement with Plan of Care: agrees        Outcome Evaluation: Patient denied SI, HI, AVH.  Had an outburst during the evening, yelling and cussing in the day room because he had to wait for the med nurse get his medications and that it's hot in here.

## 2022-10-08 NOTE — PROGRESS NOTES
" Inpatient Psych Progress Note     Clinician: Chinmay Nation MD  Admission Date: 10/2/2022  10:13 EDT 10/08/22    Behavioral Health Treatment Plan and Problem List: I have reviewed and approved the Behavioral Health Treatment Plan and Problem list.    Allergies  No Known Allergies    Hospital Day: 6 days      Assessment completed within view of staff    History  CC/clinical focus: depression, SI    Interval HPI: Patient seen and evaluated by me.  Chart reviewed. \"I feel like s**t\".  Dysphoric this morning, but says because he did not sleep last night with the change to doxepin.  Had an outburst during the evening, he yelling and cursing in the day room because he had to wait for the med to get his medication.  He denies suicidal ideation this morning.  Med Compliant.  ROS otherwise as below.      Interval Review of Systems:   General ROS: negative for - fever or malaise  Endocrine ROS: negative for - palpitations  Respiratory ROS: no cough, shortness of breath, or wheezing  Cardiovascular ROS: no chest pain or dyspnea on exertion  Gastrointestinal ROS: no abdominal pain,no black or bloody stools    /62 (BP Location: Right arm, Patient Position: Lying)   Pulse 79   Temp 98.2 °F (36.8 °C) (Temporal)   Resp 18   Ht 182.9 cm (72\")   Wt 70.5 kg (155 lb 6.4 oz)   SpO2 98%   BMI 21.08 kg/m²     Mental Status Exam  Mood: dysphoric  Affect: dysphoric   Thought Processes: linear  Thought Content: negativistic  Hallucinations: no  Suicidal Thoughts: denies  Suicidal Plan/Intent: denies  Hopelesness:Moderate  Homicidal Thoughts:  denies      Medical Decision Making:   Labs:     Lab Results (last 24 hours)     ** No results found for the last 24 hours. **            Radiology:     Imaging Results (Last 24 Hours)     ** No results found for the last 24 hours. **            EKG:     ECG/EMG Results (most recent)     Procedure Component Value Units Date/Time    ECG 12 Lead [[id number]] Collected: 10/03/22 0146     " Updated: 10/04/22 0911     QT Interval 344 ms      QTC Interval 425 ms     Narrative:      Test Reason : Baseline Cardiac Status  Blood Pressure :   */*   mmHG  Vent. Rate :  92 BPM     Atrial Rate :  92 BPM     P-R Int : 104 ms          QRS Dur :  80 ms      QT Int : 344 ms       P-R-T Axes :  73  76  77 degrees     QTc Int : 425 ms    Sinus rhythm with short OR  Otherwise normal ECG  When compared with ECG of 03-OCT-2022 01:46, (Unconfirmed)  Previous ECG has undetermined rhythm, needs review  Confirmed by Jagdeep Serrano (2020) on 10/4/2022 9:07:29 AM    Referred By: KAREN           Confirmed By: Jagdeep Serrano           Medications:  doxepin, 25 mg, Oral, Nightly  lurasidone, 40 mg, Oral, Daily With Dinner  nicotine, 1 patch, Transdermal, Q24H           All medications reviewed.      Assessment and Plan:    Bipolar II Disorder, MRE depressed with mixed features   Continue latuda 40mg Daily.  Continue individual and group therapy on the unit  We are pursuing disposition options.   Pursuing sober living facilities.  Aim to discharge early next week.       Insomnia  - Stop doxepin and restart trial of trazodone but increase the trazodone to 200 mg at bedtime     Hepatitis C  - Followup as an outpatient     Elevated LFTs  - trending down.  Outpatient followup         Continue hospitalization for safety and stabilization.  Continue current level of Special Precautions (q15 minute checks).

## 2022-10-09 PROCEDURE — 99232 SBSQ HOSP IP/OBS MODERATE 35: CPT | Performed by: PSYCHIATRY & NEUROLOGY

## 2022-10-09 RX ORDER — MIRTAZAPINE 15 MG/1
15 TABLET, FILM COATED ORAL NIGHTLY
Status: DISCONTINUED | OUTPATIENT
Start: 2022-10-09 | End: 2022-10-10 | Stop reason: HOSPADM

## 2022-10-09 RX ADMIN — LURASIDONE HYDROCHLORIDE 40 MG: 40 TABLET, FILM COATED ORAL at 17:01

## 2022-10-09 RX ADMIN — NICOTINE TRANSDERMAL SYSTEM 1 PATCH: 21 PATCH, EXTENDED RELEASE TRANSDERMAL at 16:24

## 2022-10-09 RX ADMIN — MIRTAZAPINE 15 MG: 15 TABLET, FILM COATED ORAL at 21:28

## 2022-10-09 NOTE — PROGRESS NOTES
" Inpatient Psych Progress Note     Clinician: Chinmay Nation MD  Admission Date: 10/2/2022  09:07 EDT 10/09/22    Behavioral Health Treatment Plan and Problem List: I have reviewed and approved the Behavioral Health Treatment Plan and Problem list.    Allergies  No Known Allergies    Hospital Day: 7 days      Assessment completed within view of staff    History  CC/clinical focus: depression, SI    Interval HPI: Patient seen and evaluated by me.  Chart reviewed.   Patient rates  level of depression (subjectively) at a   6/10.  Anxiety   6/10.  Patient tolerating meds okay.  Denies side effects.  He voiced some suicidal ideation to nursing this morning.  Complains of persisting insomnia.  He reports he did better with Remeron historically  Med Compliant.  ROS otherwise as below.      Interval Review of Systems:   General ROS: negative for - fever or malaise  Endocrine ROS: negative for - palpitations  Respiratory ROS: no cough, shortness of breath, or wheezing  Cardiovascular ROS: no chest pain or dyspnea on exertion  Gastrointestinal ROS: no abdominal pain,no black or bloody stools    /57 (BP Location: Right arm, Patient Position: Sitting)   Pulse 80   Temp 97.7 °F (36.5 °C) (Temporal)   Resp 18   Ht 182.9 cm (72\")   Wt 70.5 kg (155 lb 6.4 oz)   SpO2 95%   BMI 21.08 kg/m²     Mental Status Exam  Mood: depressed  Affect: mood-congruent   Thought Processes: linear  Thought Content: negativistic  Hallucinations: no  Suicidal Thoughts: denies  Suicidal Plan/Intent: denies  Hopelesness:Moderate  Homicidal Thoughts:  denies      Medical Decision Making:   Labs:     Lab Results (last 24 hours)     ** No results found for the last 24 hours. **            Radiology:     Imaging Results (Last 24 Hours)     ** No results found for the last 24 hours. **            EKG:     ECG/EMG Results (most recent)     Procedure Component Value Units Date/Time    ECG 12 Lead [[id number]] Collected: 10/03/22 0146     Updated: " 10/04/22 0911     QT Interval 344 ms      QTC Interval 425 ms     Narrative:      Test Reason : Baseline Cardiac Status  Blood Pressure :   */*   mmHG  Vent. Rate :  92 BPM     Atrial Rate :  92 BPM     P-R Int : 104 ms          QRS Dur :  80 ms      QT Int : 344 ms       P-R-T Axes :  73  76  77 degrees     QTc Int : 425 ms    Sinus rhythm with short MO  Otherwise normal ECG  When compared with ECG of 03-OCT-2022 01:46, (Unconfirmed)  Previous ECG has undetermined rhythm, needs review  Confirmed by Jagdeep Serrano (2020) on 10/4/2022 9:07:29 AM    Referred By: KAREN           Confirmed By: Jagdeep Serrano           Medications:  lurasidone, 40 mg, Oral, Daily With Dinner  nicotine, 1 patch, Transdermal, Q24H  traZODone, 200 mg, Oral, Nightly           All medications reviewed.      Assessment and Plan:   Bipolar II Disorder, MRE depressed with mixed features   Continue latuda 40mg Daily.  Continue individual and group therapy on the unit  We are pursuing disposition options.   Pursuing sober living facilities.  Aim to discharge early next week.       Insomnia  -Stop trazodone and start trial of Remeron 15 mg at bedtime     Hepatitis C  - Followup as an outpatient     Elevated LFTs  - trending down.  Outpatient followup          Continue hospitalization for safety and stabilization.  Continue current level of Special Precautions (q15 minute checks).

## 2022-10-09 NOTE — PLAN OF CARE
Problem: Cognitive Impairment (Anxiety Signs/Symptoms)  Goal: Optimized Cognitive Function (Anxiety Signs/Symptoms)  Outcome: Ongoing, Progressing   Goal Outcome Evaluation:  Plan of Care Reviewed With: patient  Patient Agreement with Plan of Care: agrees

## 2022-10-09 NOTE — PLAN OF CARE
Goal Outcome Evaluation:  Plan of Care Reviewed With: patient  Patient Agreement with Plan of Care: agrees     Progress: no change   Patient rates anxiety 6 and depression 6, reports having suicidal thoughts verbalizes can inform staff of thoughts, denies hallucinations.

## 2022-10-10 VITALS
TEMPERATURE: 97.9 F | SYSTOLIC BLOOD PRESSURE: 118 MMHG | BODY MASS INDEX: 21.05 KG/M2 | OXYGEN SATURATION: 99 % | HEIGHT: 72 IN | HEART RATE: 102 BPM | RESPIRATION RATE: 18 BRPM | DIASTOLIC BLOOD PRESSURE: 70 MMHG | WEIGHT: 155.4 LBS

## 2022-10-10 PROBLEM — B19.20 HEPATITIS C: Status: ACTIVE | Noted: 2022-10-10

## 2022-10-10 PROBLEM — F31.81 BIPOLAR 2 DISORDER, MAJOR DEPRESSIVE EPISODE: Status: ACTIVE | Noted: 2022-10-02

## 2022-10-10 PROCEDURE — 99239 HOSP IP/OBS DSCHRG MGMT >30: CPT | Performed by: PSYCHIATRY & NEUROLOGY

## 2022-10-10 RX ORDER — MIRTAZAPINE 15 MG/1
15 TABLET, FILM COATED ORAL NIGHTLY
Qty: 30 TABLET | Refills: 0 | Status: ON HOLD | OUTPATIENT
Start: 2022-10-10 | End: 2022-12-27

## 2022-10-10 RX ORDER — LURASIDONE HYDROCHLORIDE 40 MG/1
40 TABLET, FILM COATED ORAL
Qty: 30 TABLET | Refills: 0 | Status: ON HOLD | OUTPATIENT
Start: 2022-10-10 | End: 2022-12-27

## 2022-10-10 NOTE — PROGRESS NOTES
"Discharge Planning Assessment  [facility]     Patient Name: Alexis Guthrie  MRN: [id number]  Today's Date: 10/10/2022    Admit Date: 10/2/2022    Patient is being discharged today. This therapist met with Patient who denies SI, HI, and AVH. Patient had been accepted to [facility] today for sober living treatment. However Patient refused to go there and stated that he would not be going anywhere that \"has rules\". Patient then requested that he be sent to [facility] in [place]. This therapist was agreeable to help him with this, and explained that this was a process, and that we could not just send him there if he had not been accepted. When this therapist offered to send a referral Patient stated that this would take too long, and that he would need a ride to [facility] in [place]. This therapist explained that [facility] would only be agreeable to transport him to a place of residence. He reported that he did not have any addresses to provide that he could go to in [place], and that he needed to go to [place]. Patient became angry and stated that we were unwilling to help him, although alternative options were offered. Patient states that he will just walk to [place] at discharge. He signed consent for [facility] for aftercare. Patient did not appear to be appropriate for hold or involuntary transfer at time of discharge. Patient was educated on the crisis hotline, and when to call 911 or present to the nearest emergency room in the case of an emergency.        FELICITY Monroy    "

## 2022-10-10 NOTE — PLAN OF CARE
Goal Outcome Evaluation:  Plan of Care Reviewed With: patient  Patient Agreement with Plan of Care: agrees        Outcome Evaluation: Patient reports anxiety and depression at 10.  Reports SI with no plan.  Patient agreed to seek out staff if thoughts increase.  Cordelia CHOI.

## 2022-10-11 NOTE — DISCHARGE SUMMARY
"      PSYCHIATRIC DISCHARGE SUMMARY     Patient Identification:  Name:  Alexis Guthrie  Age:  42 y.o.  Sex:  male  :  1980  MRN:  [id number]  Visit Number:  [id number]    Date of Admission:10/2/2022   Date of Discharge: 10/10/2022     Discharge Diagnosis:  Active Problems:    Bipolar 2 disorder, major depressive episode (HCC)    Hepatitis C      Admission Diagnosis:  Depression with suicidal ideation [F32.A, R45.851]     Hospital Course  Patient is a 42 y.o. male presented with depression and SI.  Admitted for crisis stabilization.  Patient with 1 previous admission at this facility.  No acutely concerning labs on admission.  Patient vague about recent substance abuse.  Patient reported history concerning for bipolar disorder with current mixed episode.  Patient started on Latuda 40 mg daily.  He was tried on multiple medications for insomnia, eventually settling on mirtazapine 15 mg nightly.  Patient often vague about symptom burden and poorly cooperative, with symptom report incongruent with his behavior on the unit.  Patient exhibited no behavior concerning for harm to himself or others.  A bed was obtained for him at a nearby sober living facility.  However, at the last minute, patient refused to attend, saying he refuses to go \"anywhere that has rules.\"  He was then rude and demanding to his therapist, unreasonable about discharge disposition and transportation options, raising the likelihood of malingering and/or personality disorder.  Patient then demanded to walk from the hospital.  He exhibited no acute psychiatric symptoms that would necessitate involuntary hold or transfer, so he was discharged and permitted to walk from the hospital.    On the day of discharge, patient denied SI, HI or AVH. Patient was stable and appropriate by the conclusion of this admission, denying significant symptoms of mood, psychotic or thought disorder. Patient showed improvement of presenting symptoms and was deemed " "appropriate for discharge today.    Mental Status Exam upon discharge:   Mood \"ready to go\"   Affect-congruent, appropriate, stable  Thought Content-goal directed, no delusional material present  Thought process-linear, organized.  Suicidality: No SI  Homicidality: No HI  Perception: No AH/VH    Procedures Performed         Consults:   Consults     No orders found from 9/3/2022 to 10/3/2022.          Pertinent Test Results:   Lab Results (last 7 days)     ** No results found for the last 168 hours. **          Condition on Discharge:  improved    Vital Signs       Discharge Disposition:  Home or Self Care    Discharge Medications:     Discharge Medications      New Medications      Instructions Start Date   Latuda 40 MG tablet tablet  Generic drug: lurasidone   40 mg, Oral, Daily With Dinner      mirtazapine 15 MG tablet  Commonly known as: REMERON   15 mg, Oral, Nightly             Discharge Diet: Normal  Diet Instructions    REGULAR DIET           Activity at Discharge: Normal  Activity Instructions    AS TOLERATED           Follow-up Appointments  No future appointments.      Test Results Pending at Discharge  None     Time: I spent greater than 30 minutes on this discharge activity which included: face-to-face encounter with the patient, reviewing the data in the system, coordination of the care with the nursing staff as well as consultants, documentation, and entering orders.      Clinician:   Mode Ernst MD  10/11/22  13:49 EDT  "

## 2022-12-26 ENCOUNTER — HOSPITAL ENCOUNTER (EMERGENCY)
Facility: HOSPITAL | Age: 42
Discharge: PSYCHIATRIC OR SUBSTANCE USE FACILITY | DRG: 885 | End: 2022-12-27
Attending: STUDENT IN AN ORGANIZED HEALTH CARE EDUCATION/TRAINING PROGRAM | Admitting: STUDENT IN AN ORGANIZED HEALTH CARE EDUCATION/TRAINING PROGRAM
Payer: MEDICAID

## 2022-12-26 DIAGNOSIS — R45.850 HOMICIDAL BEHAVIOR: ICD-10-CM

## 2022-12-26 DIAGNOSIS — R45.89 SUICIDAL BEHAVIOR WITHOUT ATTEMPTED SELF-INJURY: Primary | ICD-10-CM

## 2022-12-26 LAB
ALBUMIN SERPL-MCNC: 4.1 G/DL (ref 3.5–5.2)
ALBUMIN/GLOB SERPL: 1.2 G/DL
ALP SERPL-CCNC: 68 U/L (ref 39–117)
ALT SERPL W P-5'-P-CCNC: 135 U/L (ref 1–41)
AMPHET+METHAMPHET UR QL: NEGATIVE
AMPHETAMINES UR QL: NEGATIVE
ANION GAP SERPL CALCULATED.3IONS-SCNC: 13.3 MMOL/L (ref 5–15)
AST SERPL-CCNC: 84 U/L (ref 1–40)
BARBITURATES UR QL SCN: NEGATIVE
BASOPHILS # BLD AUTO: 0.03 10*3/MM3 (ref 0–0.2)
BASOPHILS NFR BLD AUTO: 0.2 % (ref 0–1.5)
BENZODIAZ UR QL SCN: NEGATIVE
BILIRUB SERPL-MCNC: <0.2 MG/DL (ref 0–1.2)
BILIRUB UR QL STRIP: NEGATIVE
BUN SERPL-MCNC: 21 MG/DL (ref 6–20)
BUN/CREAT SERPL: 18.4 (ref 7–25)
BUPRENORPHINE SERPL-MCNC: NEGATIVE NG/ML
CALCIUM SPEC-SCNC: 8.9 MG/DL (ref 8.6–10.5)
CANNABINOIDS SERPL QL: POSITIVE
CHLORIDE SERPL-SCNC: 104 MMOL/L (ref 98–107)
CLARITY UR: CLEAR
CO2 SERPL-SCNC: 23.7 MMOL/L (ref 22–29)
COCAINE UR QL: NEGATIVE
COLOR UR: YELLOW
CREAT SERPL-MCNC: 1.14 MG/DL (ref 0.76–1.27)
DEPRECATED RDW RBC AUTO: 42.7 FL (ref 37–54)
EGFRCR SERPLBLD CKD-EPI 2021: 81.8 ML/MIN/1.73
EOSINOPHIL # BLD AUTO: 0.09 10*3/MM3 (ref 0–0.4)
EOSINOPHIL NFR BLD AUTO: 0.7 % (ref 0.3–6.2)
ERYTHROCYTE [DISTWIDTH] IN BLOOD BY AUTOMATED COUNT: 13 % (ref 12.3–15.4)
ETHANOL BLD-MCNC: 59 MG/DL (ref 0–10)
ETHANOL UR QL: 0.06 %
FLUAV RNA RESP QL NAA+PROBE: NOT DETECTED
FLUBV RNA RESP QL NAA+PROBE: NOT DETECTED
GLOBULIN UR ELPH-MCNC: 3.3 GM/DL
GLUCOSE SERPL-MCNC: 117 MG/DL (ref 65–99)
GLUCOSE UR STRIP-MCNC: NEGATIVE MG/DL
HCT VFR BLD AUTO: 50.5 % (ref 37.5–51)
HGB BLD-MCNC: 16.6 G/DL (ref 13–17.7)
HGB UR QL STRIP.AUTO: NEGATIVE
IMM GRANULOCYTES # BLD AUTO: 0.03 10*3/MM3 (ref 0–0.05)
IMM GRANULOCYTES NFR BLD AUTO: 0.2 % (ref 0–0.5)
KETONES UR QL STRIP: ABNORMAL
LEUKOCYTE ESTERASE UR QL STRIP.AUTO: NEGATIVE
LYMPHOCYTES # BLD AUTO: 3.9 10*3/MM3 (ref 0.7–3.1)
LYMPHOCYTES NFR BLD AUTO: 32.2 % (ref 19.6–45.3)
MAGNESIUM SERPL-MCNC: 2 MG/DL (ref 1.6–2.6)
MCH RBC QN AUTO: 29.6 PG (ref 26.6–33)
MCHC RBC AUTO-ENTMCNC: 32.9 G/DL (ref 31.5–35.7)
MCV RBC AUTO: 90 FL (ref 79–97)
METHADONE UR QL SCN: NEGATIVE
MONOCYTES # BLD AUTO: 0.86 10*3/MM3 (ref 0.1–0.9)
MONOCYTES NFR BLD AUTO: 7.1 % (ref 5–12)
NEUTROPHILS NFR BLD AUTO: 59.6 % (ref 42.7–76)
NEUTROPHILS NFR BLD AUTO: 7.22 10*3/MM3 (ref 1.7–7)
NITRITE UR QL STRIP: NEGATIVE
NRBC BLD AUTO-RTO: 0 /100 WBC (ref 0–0.2)
OPIATES UR QL: NEGATIVE
OXYCODONE UR QL SCN: NEGATIVE
PCP UR QL SCN: NEGATIVE
PH UR STRIP.AUTO: 5.5 [PH] (ref 5–8)
PLATELET # BLD AUTO: 307 10*3/MM3 (ref 140–450)
PMV BLD AUTO: 9.5 FL (ref 6–12)
POTASSIUM SERPL-SCNC: 3.6 MMOL/L (ref 3.5–5.2)
PROPOXYPH UR QL: NEGATIVE
PROT SERPL-MCNC: 7.4 G/DL (ref 6–8.5)
PROT UR QL STRIP: ABNORMAL
RBC # BLD AUTO: 5.61 10*6/MM3 (ref 4.14–5.8)
SARS-COV-2 RNA RESP QL NAA+PROBE: NOT DETECTED
SODIUM SERPL-SCNC: 141 MMOL/L (ref 136–145)
SP GR UR STRIP: 1.02 (ref 1–1.03)
TRICYCLICS UR QL SCN: NEGATIVE
UROBILINOGEN UR QL STRIP: ABNORMAL
WBC NRBC COR # BLD: 12.13 10*3/MM3 (ref 3.4–10.8)

## 2022-12-26 PROCEDURE — 36415 COLL VENOUS BLD VENIPUNCTURE: CPT

## 2022-12-26 PROCEDURE — 85025 COMPLETE CBC W/AUTO DIFF WBC: CPT | Performed by: STUDENT IN AN ORGANIZED HEALTH CARE EDUCATION/TRAINING PROGRAM

## 2022-12-26 PROCEDURE — 82077 ASSAY SPEC XCP UR&BREATH IA: CPT | Performed by: STUDENT IN AN ORGANIZED HEALTH CARE EDUCATION/TRAINING PROGRAM

## 2022-12-26 PROCEDURE — C9803 HOPD COVID-19 SPEC COLLECT: HCPCS

## 2022-12-26 PROCEDURE — 99285 EMERGENCY DEPT VISIT HI MDM: CPT

## 2022-12-26 PROCEDURE — 80306 DRUG TEST PRSMV INSTRMNT: CPT | Performed by: STUDENT IN AN ORGANIZED HEALTH CARE EDUCATION/TRAINING PROGRAM

## 2022-12-26 PROCEDURE — 80053 COMPREHEN METABOLIC PANEL: CPT | Performed by: STUDENT IN AN ORGANIZED HEALTH CARE EDUCATION/TRAINING PROGRAM

## 2022-12-26 PROCEDURE — 87636 SARSCOV2 & INF A&B AMP PRB: CPT | Performed by: STUDENT IN AN ORGANIZED HEALTH CARE EDUCATION/TRAINING PROGRAM

## 2022-12-26 PROCEDURE — 83735 ASSAY OF MAGNESIUM: CPT | Performed by: STUDENT IN AN ORGANIZED HEALTH CARE EDUCATION/TRAINING PROGRAM

## 2022-12-26 PROCEDURE — 81003 URINALYSIS AUTO W/O SCOPE: CPT | Performed by: STUDENT IN AN ORGANIZED HEALTH CARE EDUCATION/TRAINING PROGRAM

## 2022-12-26 RX ORDER — SODIUM CHLORIDE 0.9 % (FLUSH) 0.9 %
10 SYRINGE (ML) INJECTION AS NEEDED
Status: DISCONTINUED | OUTPATIENT
Start: 2022-12-26 | End: 2022-12-26

## 2022-12-26 RX ORDER — ONDANSETRON 2 MG/ML
4 INJECTION INTRAMUSCULAR; INTRAVENOUS ONCE
Status: DISCONTINUED | OUTPATIENT
Start: 2022-12-26 | End: 2022-12-26

## 2022-12-27 ENCOUNTER — HOSPITAL ENCOUNTER (INPATIENT)
Facility: HOSPITAL | Age: 42
LOS: 2 days | Discharge: HOME | DRG: 885 | End: 2022-12-29
Attending: STUDENT IN AN ORGANIZED HEALTH CARE EDUCATION/TRAINING PROGRAM | Admitting: STUDENT IN AN ORGANIZED HEALTH CARE EDUCATION/TRAINING PROGRAM
Payer: MEDICAID

## 2022-12-27 VITALS
RESPIRATION RATE: 17 BRPM | OXYGEN SATURATION: 97 % | SYSTOLIC BLOOD PRESSURE: 99 MMHG | BODY MASS INDEX: 24.22 KG/M2 | WEIGHT: 178.8 LBS | HEIGHT: 72 IN | TEMPERATURE: 98.1 F | DIASTOLIC BLOOD PRESSURE: 66 MMHG | HEART RATE: 86 BPM

## 2022-12-27 PROBLEM — R45.851 SUICIDAL IDEATION: Status: ACTIVE | Noted: 2022-12-27

## 2022-12-27 LAB
QT INTERVAL: 394 MS
QTC INTERVAL: 451 MS

## 2022-12-27 PROCEDURE — 99223 1ST HOSP IP/OBS HIGH 75: CPT | Performed by: PSYCHIATRY & NEUROLOGY

## 2022-12-27 PROCEDURE — 93005 ELECTROCARDIOGRAM TRACING: CPT | Performed by: STUDENT IN AN ORGANIZED HEALTH CARE EDUCATION/TRAINING PROGRAM

## 2022-12-27 RX ORDER — IBUPROFEN 400 MG/1
400 TABLET ORAL EVERY 6 HOURS PRN
Status: DISCONTINUED | OUTPATIENT
Start: 2022-12-27 | End: 2022-12-29 | Stop reason: HOSPADM

## 2022-12-27 RX ORDER — LURASIDONE HYDROCHLORIDE 40 MG/1
40 TABLET, FILM COATED ORAL DAILY
Status: DISCONTINUED | OUTPATIENT
Start: 2022-12-27 | End: 2022-12-29 | Stop reason: HOSPADM

## 2022-12-27 RX ORDER — ACETAMINOPHEN 325 MG/1
650 TABLET ORAL EVERY 6 HOURS PRN
Status: DISCONTINUED | OUTPATIENT
Start: 2022-12-27 | End: 2022-12-27

## 2022-12-27 RX ORDER — BENZONATATE 100 MG/1
100 CAPSULE ORAL 3 TIMES DAILY PRN
Status: DISCONTINUED | OUTPATIENT
Start: 2022-12-27 | End: 2022-12-29 | Stop reason: HOSPADM

## 2022-12-27 RX ORDER — HYDROXYZINE HYDROCHLORIDE 25 MG/1
50 TABLET, FILM COATED ORAL EVERY 6 HOURS PRN
Status: DISCONTINUED | OUTPATIENT
Start: 2022-12-27 | End: 2022-12-29 | Stop reason: HOSPADM

## 2022-12-27 RX ORDER — MIRTAZAPINE 15 MG/1
7.5 TABLET, FILM COATED ORAL NIGHTLY
Status: DISCONTINUED | OUTPATIENT
Start: 2022-12-27 | End: 2022-12-29 | Stop reason: HOSPADM

## 2022-12-27 RX ORDER — NICOTINE 21 MG/24HR
1 PATCH, TRANSDERMAL 24 HOURS TRANSDERMAL
Status: DISCONTINUED | OUTPATIENT
Start: 2022-12-27 | End: 2022-12-28

## 2022-12-27 RX ORDER — QUETIAPINE FUMARATE 100 MG/1
100 TABLET, FILM COATED ORAL NIGHTLY
Status: ON HOLD | COMMUNITY
End: 2022-12-27

## 2022-12-27 RX ORDER — ONDANSETRON 4 MG/1
4 TABLET, FILM COATED ORAL EVERY 6 HOURS PRN
Status: DISCONTINUED | OUTPATIENT
Start: 2022-12-27 | End: 2022-12-29 | Stop reason: HOSPADM

## 2022-12-27 RX ORDER — MULTIPLE VITAMINS W/ MINERALS TAB 9MG-400MCG
1 TAB ORAL DAILY
Status: DISCONTINUED | OUTPATIENT
Start: 2022-12-27 | End: 2022-12-29 | Stop reason: HOSPADM

## 2022-12-27 RX ORDER — ECHINACEA PURPUREA EXTRACT 125 MG
2 TABLET ORAL AS NEEDED
Status: DISCONTINUED | OUTPATIENT
Start: 2022-12-27 | End: 2022-12-29 | Stop reason: HOSPADM

## 2022-12-27 RX ORDER — FAMOTIDINE 20 MG/1
20 TABLET, FILM COATED ORAL 2 TIMES DAILY PRN
Status: DISCONTINUED | OUTPATIENT
Start: 2022-12-27 | End: 2022-12-29 | Stop reason: HOSPADM

## 2022-12-27 RX ORDER — MULTIVITAMIN WITH IRON
2 TABLET ORAL DAILY
Status: DISCONTINUED | OUTPATIENT
Start: 2022-12-27 | End: 2022-12-29 | Stop reason: HOSPADM

## 2022-12-27 RX ORDER — BENZTROPINE MESYLATE 1 MG/1
2 TABLET ORAL ONCE AS NEEDED
Status: DISCONTINUED | OUTPATIENT
Start: 2022-12-27 | End: 2022-12-29 | Stop reason: HOSPADM

## 2022-12-27 RX ORDER — LOPERAMIDE HYDROCHLORIDE 2 MG/1
2 CAPSULE ORAL
Status: DISCONTINUED | OUTPATIENT
Start: 2022-12-27 | End: 2022-12-29 | Stop reason: HOSPADM

## 2022-12-27 RX ORDER — TRAZODONE HYDROCHLORIDE 50 MG/1
50 TABLET ORAL NIGHTLY PRN
Status: DISCONTINUED | OUTPATIENT
Start: 2022-12-27 | End: 2022-12-27

## 2022-12-27 RX ORDER — BENZTROPINE MESYLATE 1 MG/ML
1 INJECTION INTRAMUSCULAR; INTRAVENOUS ONCE AS NEEDED
Status: DISCONTINUED | OUTPATIENT
Start: 2022-12-27 | End: 2022-12-29 | Stop reason: HOSPADM

## 2022-12-27 RX ORDER — ALUMINA, MAGNESIA, AND SIMETHICONE 2400; 2400; 240 MG/30ML; MG/30ML; MG/30ML
15 SUSPENSION ORAL EVERY 6 HOURS PRN
Status: DISCONTINUED | OUTPATIENT
Start: 2022-12-27 | End: 2022-12-29 | Stop reason: HOSPADM

## 2022-12-27 RX ADMIN — LURASIDONE HYDROCHLORIDE 40 MG: 40 TABLET, FILM COATED ORAL at 15:27

## 2022-12-27 RX ADMIN — Medication 1 TABLET: at 10:23

## 2022-12-27 RX ADMIN — TRAZODONE HYDROCHLORIDE 50 MG: 50 TABLET ORAL at 01:49

## 2022-12-27 RX ADMIN — Medication 2 TABLET: at 10:23

## 2022-12-27 RX ADMIN — MIRTAZAPINE 7.5 MG: 15 TABLET, FILM COATED ORAL at 21:16

## 2022-12-27 RX ADMIN — Medication 100 MG: at 10:23

## 2022-12-27 NOTE — ED PROVIDER NOTES
Subjective     History provided by:  Patient  Mental Health Problem  Presenting symptoms: agitation, homicidal ideas, self-mutilation and suicidal thoughts    Degree of incapacity (severity):  Moderate  Onset quality:  Gradual  Timing:  Constant  Progression:  Worsening  Chronicity:  Recurrent  Treatment compliance:  Untreated  Relieved by:  Nothing  Associated symptoms: anxiety, feelings of worthlessness, irritability and poor judgment    Associated symptoms: no abdominal pain and no chest pain    Risk factors: hx of mental illness    Risk factors comment:  Apparently patient has been in and out of local behavioral health treatment centers within the surrounding area      Review of Systems   Constitutional: Positive for irritability. Negative for fever.   HENT: Negative.    Respiratory: Negative.    Cardiovascular: Negative.  Negative for chest pain.   Gastrointestinal: Negative.  Negative for abdominal pain.   Endocrine: Negative.    Genitourinary: Negative.  Negative for dysuria.   Skin: Negative.    Neurological: Negative.    Psychiatric/Behavioral: Positive for agitation, homicidal ideas, self-injury and suicidal ideas. The patient is nervous/anxious.    All other systems reviewed and are negative.      Past Medical History:   Diagnosis Date   • Antisocial personality disorder (HCC)    • Anxiety    • Bipolar disorder (HCC)    • Depression    • Hepatitis C    • Multiple personalities (HCC)    • PTSD (post-traumatic stress disorder)    • TBI (traumatic brain injury)        No Known Allergies    Past Surgical History:   Procedure Laterality Date   • LEG SURGERY Left        Family History   Family history unknown: Yes       Social History     Socioeconomic History   • Marital status: Legally    • Number of children: 3   • Years of education: 2nd   • Highest education level: 2nd grade   Tobacco Use   • Smoking status: Every Day     Packs/day: 0.50     Years: 25.00     Pack years: 12.50     Types: Cigarettes    • Smokeless tobacco: Former     Types: Chew, Snuff   • Tobacco comments:     Denies current use    Vaping Use   • Vaping Use: Some days   • Substances: Nicotine, Flavoring   • Devices: Disposable, Pre-filled or refillable cartridge, Pre-filled pod   Substance and Sexual Activity   • Alcohol use: Yes     Comment: occasioanl   • Drug use: Yes     Types: Marijuana   • Sexual activity: Not Currently     Partners: Female           Objective   Physical Exam  Vitals and nursing note reviewed.   Constitutional:       General: He is not in acute distress.     Appearance: He is well-developed. He is not diaphoretic.   HENT:      Head: Normocephalic and atraumatic.      Right Ear: External ear normal.      Left Ear: External ear normal.      Nose: Nose normal.   Eyes:      Conjunctiva/sclera: Conjunctivae normal.   Neck:      Vascular: No JVD.      Trachea: No tracheal deviation.   Cardiovascular:      Rate and Rhythm: Normal rate.      Heart sounds: No murmur heard.  Pulmonary:      Effort: Pulmonary effort is normal. No respiratory distress.      Breath sounds: No wheezing.   Abdominal:      Palpations: Abdomen is soft.      Tenderness: There is no abdominal tenderness.   Musculoskeletal:         General: No deformity. Normal range of motion.      Cervical back: Normal range of motion and neck supple.   Skin:     General: Skin is warm and dry.      Coloration: Skin is not pale.      Findings: No erythema or rash.   Neurological:      Mental Status: He is alert and oriented to person, place, and time.      Cranial Nerves: No cranial nerve deficit.   Psychiatric:      Comments: Patient verbalizes that he does have thoughts of harming himself and also thoughts of harming another.         Procedures           ED Course  ED Course as of 12/27/22 0016   Tue Dec 27, 2022   0015 Patient has been accepted for inpatient behavioral health admission [RB]      ED Course User Index  [RB] Juan Antonio Singh II, PA                                            MDM  Number of Diagnoses or Management Options  Homicidal behavior: established and worsening  Suicidal behavior without attempted self-injury: established and worsening     Amount and/or Complexity of Data Reviewed  Clinical lab tests: ordered and reviewed  Discuss the patient with other providers: yes    Risk of Complications, Morbidity, and/or Mortality  Presenting problems: moderate  Diagnostic procedures: moderate  Management options: moderate    Patient Progress  Patient progress: stable      Final diagnoses:   Suicidal behavior without attempted self-injury   Homicidal behavior       ED Disposition  ED Disposition     ED Disposition   DC/Transfer to Behavioral Health Condition   Stable    Comment   --             No follow-up provider specified.       Medication List      No changes were made to your prescriptions during this visit.          Juan Antonio Singh II, PA  12/27/22 0016
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
## 2022-12-27 NOTE — ED NOTES
Contacted [facility] at [phone]  Notified regarding Duty to Warn:  Patient wants to kill Micah Devendra Ceron who lives across the [address] from [employer] in [place], [place].

## 2022-12-27 NOTE — PLAN OF CARE
Goal Outcome Evaluation:  Plan of Care Reviewed With: patient  Patient Agreement with Plan of Care: agrees     Progress: improving  Outcome Evaluation: Patient mood irritable at times however, cooperative, interacting with peer. Patient denies suicidal or homicidal ideation

## 2022-12-27 NOTE — PLAN OF CARE
Goal Outcome Evaluation:  Plan of Care Reviewed With: patient  Patient Agreement with Plan of Care: agrees         Pt verbalized understanding of unit orientation and plan of care

## 2022-12-27 NOTE — NURSING NOTE
Pt assessment complete pt reports suicidal ideation for the last 3-4 days with a plan to shoot himself.     Pt states he tried to get a gun today and his brother wouldn't let him.   HI- duty to warn completed.   anx 5 dep 10  Poor appetite and sleep.   Occasional alcohol and marijuana use.

## 2022-12-28 PROCEDURE — 99232 SBSQ HOSP IP/OBS MODERATE 35: CPT | Performed by: PSYCHIATRY & NEUROLOGY

## 2022-12-28 RX ADMIN — MIRTAZAPINE 7.5 MG: 15 TABLET, FILM COATED ORAL at 21:45

## 2022-12-28 RX ADMIN — NICOTINE POLACRILEX 2 MG: 2 GUM, CHEWING BUCCAL at 20:21

## 2022-12-28 RX ADMIN — NICOTINE POLACRILEX 2 MG: 2 GUM, CHEWING BUCCAL at 14:07

## 2022-12-28 NOTE — PROGRESS NOTES
"INPATIENT PSYCHIATRIC PROGRESS NOTE    Name:  Alexis Guthrie  :  1980  MRN:  [id number]  Visit Number:  [id number]  Length of stay:  1    SUBJECTIVE    CC/Focus of Exam: bipolar disorder    INTERVAL HISTORY:  The patient was at first not willing to talk and stated that he would be ready to go tomorrow. He states he plans to go stay with a friend in [place]. He states he met this friend here on the unit. He is denying any thoughts of harm to self or others.   He got into an argument with another patient who he claims was running her mouth and he told her that he was not even talking to her. He reports no thoughts of harm to her.   Depression rating 6/10  Anxiety rating 6/10  Sleep: good  Withdrawal sx: denies  Cravin/10    Review of Systems   Respiratory: Negative.    Cardiovascular: Negative.    Gastrointestinal: Negative.    Psychiatric/Behavioral: Positive for dysphoric mood. The patient is nervous/anxious.        OBJECTIVE    Temp:  [97.2 °F (36.2 °C)-98 °F (36.7 °C)] 97.4 °F (36.3 °C)  Heart Rate:  [66-75] 67  Resp:  [18] 18  BP: ()/(56-73) 111/73    MENTAL STATUS EXAM:  Appearance:Casually dressed, good hygeine.   Cooperation:Cooperative  Psychomotor: No psychomotor agitation/retardation, No EPS, No motor tics  Speech-normal rate, amount.  Mood \"anxious\"   Affect-congruent, appropriate, stable  Thought Content-goal directed, no delusional material present  Thought process-linear, organized.  Suicidality: No SI  Homicidality: No HI  Perception: No AH/VH  Insight-fair   Judgement-fair    Lab Results (last 24 hours)     ** No results found for the last 24 hours. **             Imaging Results (Last 24 Hours)     ** No results found for the last 24 hours. **             ECG/EMG Results (most recent)     Procedure Component Value Units Date/Time    ECG 12 Lead Other [[id number]] Collected: 22     Updated: 22     QT Interval 394 ms      QTC Interval 451 ms     Narrative:      " Test Reason : Baseline Cardiac Status~  Blood Pressure :   */*   mmHG  Vent. Rate :  79 BPM     Atrial Rate :  79 BPM     P-R Int : 114 ms          QRS Dur :  98 ms      QT Int : 394 ms       P-R-T Axes :  55  29  52 degrees     QTc Int : 451 ms    Normal sinus rhythm  Normal ECG  When compared with ECG of 03-OCT-2022 01:48,  No significant change  Confirmed by Flaquito Ruiz ([id number]) on 12/27/2022 9:23:17 PM    Referred By: PITO DE LA CRUZ           Confirmed By: Flaquito Ruiz           ALLERGIES: Patient has no known allergies.    Medication Review:   Scheduled Medications:  B-complex with vitamin C, 2 tablet, Oral, Daily  lurasidone, 40 mg, Oral, Daily  mirtazapine, 7.5 mg, Oral, Nightly  multivitamin with minerals, 1 tablet, Oral, Daily  nicotine, 1 patch, Transdermal, Q24H  thiamine, 100 mg, Oral, Daily         PRN Medications:  •  aluminum-magnesium hydroxide-simethicone  •  benzonatate  •  benztropine **OR** benztropine  •  famotidine  •  hydrOXYzine  •  ibuprofen  •  loperamide  •  magnesium hydroxide  •  ondansetron  •  sodium chloride   All medications reviewed.    ASSESSMENT & PLAN:      Suicidal ideation  -SP 3       Homicidal ideation  -SP3  -Duty to warn completed in the ED.        Bipolar 2 disorder, major depressive episode (HCC)  -Lurasidone  -Mirtazapine       Hepatitis C  -Patient reports no previous treatment  -Encourage sobriety and outpatient treatment       Alcohol use mild  -Monitor for withdrawals       THC use moderate  -Encourage cessation       Nicotine use disorder  -Nicotine replacement    Special precautions: Special Precautions Level 3 (q15 min checks) .    Behavioral Health Treatment Plan and Problem List: I have reviewed and approved the Behavioral Health Treatment Plan and Problem list.  The patient has had a chance to review and agrees with the treatment plan.     Clinician:  Nuha Gregory MD  12/28/22  12:56 EST

## 2022-12-28 NOTE — PHARMACY PATIENT ASSISTANCE
Pharmacy checked on price of Latuda 40 mg initiated inpatient. Per patient's plan, copay will be $0 for 1 month supply. No other issues identified at this time.    Thank you,    Nadira Briceño, PharmD  12/28/22  08:15 EST

## 2022-12-28 NOTE — NURSING NOTE
"Patient made derogatory statement to another Patient in day room stating \" bitch\" , educated , redirected , tolerated. Patient denies thoughts to harm her or others. . Patient instructed to maintain personal space of 10-15 ft  refrain from speaking to the other,  Patient then stating he wanted to discharge, then stating he wouldn't see Physician \"fucker\", security called to unit, Dr. Gregory notified , states will be on unit to see Patient    "

## 2022-12-28 NOTE — PLAN OF CARE
Goal Outcome Evaluation:  Plan of Care Reviewed With: patient  Patient Agreement with Plan of Care: agrees     Progress: no change  Outcome Evaluation: Patient has displayed labile mood this shift , including verbal episode with peer, see note.  Patient has required redirection at times including cursing, inappropriate content of language in dayroom, tolerated . Patient denies suicidal or homicidal ideation

## 2022-12-28 NOTE — PLAN OF CARE
Goal Outcome Evaluation:  Plan of Care Reviewed With: patient  Patient Agreement with Plan of Care: agrees      Patient has been pleasant and cooperative throughout shift.  He denies SI/HI/AVH.  He reports anxiety a 5 and depression a 0.

## 2022-12-28 NOTE — PLAN OF CARE
Goal Outcome Evaluation:  Plan of Care Reviewed With: patient  Patient Agreement with Plan of Care: agrees  Consent Given to Review Plan with: No one  Progress: no change  Outcome Evaluation: Therapist met with Patient to review treatment progress and aftercare recommendations; Patient agreeable.      Problem: Adult Behavioral Health Plan of Care  Goal: Plan of Care Review  Outcome: Ongoing, Progressing  Flowsheets  Taken 12/28/2022 1524  Progress: no change  Plan of Care Reviewed With: patient  Patient Agreement with Plan of Care: agrees  Outcome Evaluation:   Therapist met with Patient to review treatment progress and aftercare recommendations   Patient agreeable.  Taken 12/27/2022 1348  Consent Given to Review Plan with: No one  Goal: Optimized Coping Skills in Response to Life Stressors  Outcome: Ongoing, Progressing  Flowsheets (Taken 12/28/2022 1524)  Optimized Coping Skills in Response to Life Stressors: making progress toward outcome  Intervention: Promote Effective Coping Strategies  Flowsheets (Taken 12/28/2022 1524)  Supportive Measures:   active listening utilized   counseling provided   guided imagery facilitated   verbalization of feelings encouraged  Goal: Develops/Participates in Therapeutic [place] to Support Successful Transition  Outcome: Ongoing, Progressing  Flowsheets (Taken 12/28/2022 1524)  Develops/Participates in Therapeutic [place] to Support Successful Transition: making progress toward outcome  Intervention: Foster Therapeutic [place]  Flowsheets (Taken 12/28/2022 1524)  Trust Relationship/Rapport:   care explained   questions encouraged   choices provided   reassurance provided   emotional support provided   thoughts/feelings acknowledged   empathic listening provided   questions answered     DATA: Therapist met with Patient individually this date. Patient agreeable to discuss current treatment progress and discharge concerns.     CLINICAL MANUVERING/INTERVENTIONS:  Assisted Patient  in processing session content; acknowledged and normalized Patient’s thoughts, feelings, and concerns by utilizing a person-centered approach in efforts to build appropriate rapport and a positive therapeutic relationship with open and honest communication. Allowed Patient to ventilate regarding current stressors and triggers for negative emotions and thoughts in a safe nonjudgmental environment with unconditional positive regard, active listening skills, and empathy.     ASSESSMENT: Patient was seen 1-1 for a follow up today. Patient continues to receive treatment for SI. Patient is focused on his discharge plan today. Patient is no longer agreeable to sober living placement. He states that he has spoke with another patient on the unit, and that he will be going home with him. This therapist discouraged this plan, and made him aware that we would have to confirm with this patient's mother that he could go to this residence as this is her property. Patient became angry about this and said that this was not necessary. Patient stormed out of the office calling this therapist multiple profanities. Patient has been argumentative with other staff and patients today as well.     PLAN:   Patient will continue stabilization. Patient will continue to receive services offered by Treatment Team.     This therapist has attempted to call the owner of the residence that patient has requested to go to twice today without success.

## 2022-12-29 VITALS
TEMPERATURE: 97.7 F | BODY MASS INDEX: 23.62 KG/M2 | WEIGHT: 174.4 LBS | HEART RATE: 79 BPM | OXYGEN SATURATION: 98 % | SYSTOLIC BLOOD PRESSURE: 114 MMHG | DIASTOLIC BLOOD PRESSURE: 59 MMHG | HEIGHT: 72 IN | RESPIRATION RATE: 18 BRPM

## 2022-12-29 PROCEDURE — 99238 HOSP IP/OBS DSCHRG MGMT 30/<: CPT | Performed by: PSYCHIATRY & NEUROLOGY

## 2022-12-29 RX ORDER — LURASIDONE HYDROCHLORIDE 40 MG/1
40 TABLET, FILM COATED ORAL DAILY
Qty: 30 TABLET | Refills: 0 | Status: SHIPPED | OUTPATIENT
Start: 2022-12-30

## 2022-12-29 RX ORDER — MIRTAZAPINE 15 MG/1
15 TABLET, FILM COATED ORAL NIGHTLY
Qty: 30 TABLET | Refills: 0 | Status: SHIPPED | OUTPATIENT
Start: 2022-12-29

## 2022-12-29 NOTE — DISCHARGE INSTR - APPOINTMENTS
[facility]  [address] [place] [id number]    December 30th at 9:00am

## 2022-12-29 NOTE — PLAN OF CARE
Goal Outcome Evaluation:  Plan of Care Reviewed With: patient  Patient Agreement with Plan of Care: agrees        Outcome Evaluation: Patient reports anxiety and depression at 10.  Denies SI/HI or AVH.

## 2022-12-29 NOTE — DISCHARGE PLACEMENT REQUEST
"Nadeem Stuart (42 y.o. Male)     Date of Birth   1980    Social Security Number       Address   [address] [place] [address]    Home Phone   [phone]    MRN   [id number]       Shinto   None    Marital Status   Legally                             Admission Date   12/27/22    Admission Type   Emergency    Admitting Provider   Sheila Fay MD    Attending Provider   Sheila Fay MD    Department, Room/Bed   [facility] ADULT PSYCHIATRIC, 1022/2S       Discharge Date       Discharge Disposition   Home or Self Care    Discharge Destination                               Attending Provider: Sheila Fay MD    Allergies: No Known Allergies    Isolation: None   Infection: None   Code Status: CPR    Ht: 182.9 cm (72\")   Wt: 79.1 kg (174 lb 6.4 oz)    Admission Cmt: None   Principal Problem: Suicidal ideation [R45.851]                 Active Insurance as of 12/27/2022     Primary Coverage     Payor Plan Insurance Group Employer/Plan Group    [facility] COMMUNITY PLAN [facility] COMMUNITY PLAN [facility]     Payor Plan Address Payor Plan Phone Number Payor Plan Fax Number Effective Dates    [address]   6/1/2022 - None Entered    [facility] [phone]       Subscriber Name Subscriber Birth Date Member ID       NADEEM STUART 1980 [id number]                 Emergency Contacts      (Rel.) Home Phone Work Phone Mobile Phone    LACIE STUART (Brother) [phone] -- --            Discharge Order (From admission, onward)     Start     Ordered    12/29/22 1127  Discharge patient  Once        Expected Discharge Date: 12/29/22    Discharge Disposition: Home or Self Care    Physician of Record for Attribution - Please select from Treatment Team: SERENA NICOLE [[id number]]    Review needed by CMO to determine Physician of Record: No       Question Answer Comment   Physician of Record for Attribution - Please select from Treatment Team SERENA NICOLE    Review " needed by CMO to determine Physician of Record No        12/29/22 1127

## 2022-12-29 NOTE — DISCHARGE SUMMARY
":  1980  MRN:  [id number]  Visit Number:  [id number]      Date of Admission:2022   Date of Discharge:  2022    Discharge Diagnosis:  Principal Problem:    Suicidal ideation  Active Problems:    Bipolar 2 disorder, major depressive episode (HCC)    Hepatitis C        Admission Diagnosis:  Suicidal ideation [R45.851]     KIRIT Guthrie is a 42 y.o. male who was admitted on 2022 with complaints of suicidal ideation.   For details please see H&P dated 22.     Hospital Course  Patient is a 42 y.o. male presented with suicidal ideations because he was asked to leave the place he was staying at and he was also angry at the person across the street and verbalized thought of harming him and duty to warn was completed in the ED. The patient was admitted to the [facility] AE[facility] unit for safety, further evaluation and treatment. He has a history of bipolar II disorder and was not taking his medications for some time. He was started back on lurasidone and mirtazapine. The patient had episodes of irritability and angry at another patient because of her intruvise behaviors but was able to be redirected.   The patient was also able to take part in individual and group counseling sessions and work on appropriate coping skills.  The patient made steady improvement in his mood and expressed feeling more positive and hopeful about future. Sleep and appetite were improved.  His plan was to go stay with another patient whom he had met while on the unit. He was encouraged to have a plan B and he reported that if things didn't work out he would likely go to a shelter.  The day of discharge the patient was calm, cooperative and pleasant. Mood was reported to be good, and denied SI/HI/AVH. Also reported no medication side effects.  .      Mental Status Exam upon discharge:   Mood \"good\"   Affect-congruent, appropriate, stable  Thought Content-goal directed, no delusional material present  Thought " process-linear, organized.  Suicidality: No SI  Homicidality: No HI  Perception: No AH/VH    Procedures Performed         Consults:   Consults     No orders found from 11/28/2022 to 12/28/2022.          Pertinent Test Results:   Admission on 12/27/2022   Component Date Value Ref Range Status   • QT Interval 12/27/2022 394  ms Final   • QTC Interval 12/27/2022 451  ms Final   Admission on 12/26/2022, Discharged on 12/27/2022   Component Date Value Ref Range Status   • Glucose 12/26/2022 117 (H)  65 - 99 mg/dL Final   • BUN 12/26/2022 21 (H)  6 - 20 mg/dL Final   • Creatinine 12/26/2022 1.14  0.76 - 1.27 mg/dL Final   • Sodium 12/26/2022 141  136 - 145 mmol/L Final   • Potassium 12/26/2022 3.6  3.5 - 5.2 mmol/L Final    Slight hemolysis detected by analyzer. Results may be affected.   • Chloride 12/26/2022 104  98 - 107 mmol/L Final   • CO2 12/26/2022 23.7  22.0 - 29.0 mmol/L Final   • Calcium 12/26/2022 8.9  8.6 - 10.5 mg/dL Final   • Total Protein 12/26/2022 7.4  6.0 - 8.5 g/dL Final   • Albumin 12/26/2022 4.1  3.5 - 5.2 g/dL Final   • ALT (SGPT) 12/26/2022 135 (H)  1 - 41 U/L Final   • AST (SGOT) 12/26/2022 84 (H)  1 - 40 U/L Final   • Alkaline Phosphatase 12/26/2022 68  39 - 117 U/L Final   • Total Bilirubin 12/26/2022 <0.2  0.0 - 1.2 mg/dL Final   • Globulin 12/26/2022 3.3  gm/dL Final   • A/G Ratio 12/26/2022 1.2  g/dL Final   • BUN/Creatinine Ratio 12/26/2022 18.4  7.0 - 25.0 Final   • Anion Gap 12/26/2022 13.3  5.0 - 15.0 mmol/L Final   • eGFR 12/26/2022 81.8  >60.0 mL/min/1.73 Final    National Kidney Foundation and American Society of Nephrology (ASN) Task Force recommended calculation based on the Chronic Kidney Disease Epidemiology Collaboration (CKD-EPI) equation refit without adjustment for race.   • Color, UA 12/26/2022 Yellow  Yellow, Straw Final   • Appearance, UA 12/26/2022 Clear  Clear Final   • pH, UA 12/26/2022 5.5  5.0 - 8.0 Final   • Specific Gravity, UA 12/26/2022 1.023  1.005 - 1.030 Final   •  Glucose, UA 12/26/2022 Negative  Negative Final   • Ketones, UA 12/26/2022 Trace (A)  Negative Final   • Bilirubin, UA 12/26/2022 Negative  Negative Final   • Blood, UA 12/26/2022 Negative  Negative Final   • Protein, UA 12/26/2022 Trace (A)  Negative Final   • Leuk Esterase, UA 12/26/2022 Negative  Negative Final   • Nitrite, UA 12/26/2022 Negative  Negative Final   • Urobilinogen, UA 12/26/2022 0.2 E.U./dL  0.2 - 1.0 E.U./dL Final   • THC, Screen, Urine 12/26/2022 Positive (A)  Negative Final   • Phencyclidine (PCP), Urine 12/26/2022 Negative  Negative Final   • Cocaine Screen, Urine 12/26/2022 Negative  Negative Final   • Methamphetamine, Ur 12/26/2022 Negative  Negative Final   • Opiate Screen 12/26/2022 Negative  Negative Final   • Amphetamine Screen, Urine 12/26/2022 Negative  Negative Final   • Benzodiazepine Screen, Urine 12/26/2022 Negative  Negative Final   • Tricyclic Antidepressants Screen 12/26/2022 Negative  Negative Final   • Methadone Screen, Urine 12/26/2022 Negative  Negative Final   • Barbiturates Screen, Urine 12/26/2022 Negative  Negative Final   • Oxycodone Screen, Urine 12/26/2022 Negative  Negative Final   • Propoxyphene Screen 12/26/2022 Negative  Negative Final   • Buprenorphine, Screen, Urine 12/26/2022 Negative  Negative Final   • Magnesium 12/26/2022 2.0  1.6 - 2.6 mg/dL Final   • Ethanol 12/26/2022 59 (H)  0 - 10 mg/dL Final   • Ethanol % 12/26/2022 0.059  % Final   • WBC 12/26/2022 12.13 (H)  3.40 - 10.80 10*3/mm3 Final   • RBC 12/26/2022 5.61  4.14 - 5.80 10*6/mm3 Final   • Hemoglobin 12/26/2022 16.6  13.0 - 17.7 g/dL Final   • Hematocrit 12/26/2022 50.5  37.5 - 51.0 % Final   • MCV 12/26/2022 90.0  79.0 - 97.0 fL Final   • MCH 12/26/2022 29.6  26.6 - 33.0 pg Final   • MCHC 12/26/2022 32.9  31.5 - 35.7 g/dL Final   • RDW 12/26/2022 13.0  12.3 - 15.4 % Final   • RDW-SD 12/26/2022 42.7  37.0 - 54.0 fl Final   • MPV 12/26/2022 9.5  6.0 - 12.0 fL Final   • Platelets 12/26/2022 307  140 -  450 10*3/mm3 Final   • Neutrophil % 12/26/2022 59.6  42.7 - 76.0 % Final   • Lymphocyte % 12/26/2022 32.2  19.6 - 45.3 % Final   • Monocyte % 12/26/2022 7.1  5.0 - 12.0 % Final   • Eosinophil % 12/26/2022 0.7  0.3 - 6.2 % Final   • Basophil % 12/26/2022 0.2  0.0 - 1.5 % Final   • Immature Grans % 12/26/2022 0.2  0.0 - 0.5 % Final   • Neutrophils, Absolute 12/26/2022 7.22 (H)  1.70 - 7.00 10*3/mm3 Final   • Lymphocytes, Absolute 12/26/2022 3.90 (H)  0.70 - 3.10 10*3/mm3 Final   • Monocytes, Absolute 12/26/2022 0.86  0.10 - 0.90 10*3/mm3 Final   • Eosinophils, Absolute 12/26/2022 0.09  0.00 - 0.40 10*3/mm3 Final   • Basophils, Absolute 12/26/2022 0.03  0.00 - 0.20 10*3/mm3 Final   • Immature Grans, Absolute 12/26/2022 0.03  0.00 - 0.05 10*3/mm3 Final   • nRBC 12/26/2022 0.0  0.0 - 0.2 /100 WBC Final   • COVID19 12/26/2022 Not Detected  Not Detected - Ref. Range Final   • Influenza A PCR 12/26/2022 Not Detected  Not Detected Final   • Influenza B PCR 12/26/2022 Not Detected  Not Detected Final        Condition on Discharge:  improved    Vital Signs  Temp:  [97.7 °F (36.5 °C)-98.4 °F (36.9 °C)] 97.7 °F (36.5 °C)  Heart Rate:  [74-79] 79  Resp:  [18] 18  BP: (113-114)/(59-71) 114/59      Discharge Disposition:  Home or Self Care    Discharge Medications:     Discharge Medications      New Medications      Instructions Start Date   lurasidone 40 MG tablet tablet  Commonly known as: LATUDA   40 mg, Oral, Daily   Start Date: December 30, 2022     mirtazapine 15 MG tablet  Commonly known as: REMERON   15 mg, Oral, Nightly             Discharge Diet: Regular     Activity at Discharge: As tolerated     Follow-up Appointments  Abigail Razo [facility]      Time spent in discharge: < 30 min    Clinician:   Nuha Gregory MD  12/29/22  11:28 EST

## 2022-12-29 NOTE — PROGRESS NOTES
Discharge Planning Assessment  [facility]     Patient Name: Alexis Guthrie  MRN: [id number]  Today's Date: 12/29/2022    Admit Date: 12/27/2022    Patient is being discharged today. Patient denies SI, HI, and AVH. Patient was encouraged to pursue sober living or shelter placement. He refuses and plans to go home with a peer on the unit who is also agreeable to this plan. Patient has been educated about the crisis hotline, and when to call 911 or present to the nearest emergency room. Patient has aftercare with [facility] Joyce Razo. [facility] to transport Patient to peers home around 1:00pm or later.     13:49 The mother of peer called back and stated that Patient was no to come to her home with her son. Patient was made aware of this and became angry. Patient opted to be discharged to the street. This therapist called and canceled [facility] transportation for this patient.     FELICITY Monroy

## 2023-04-27 ENCOUNTER — HOSPITAL ENCOUNTER (EMERGENCY)
Facility: HOSPITAL | Age: 43
Discharge: STILL ADMITTED OR ADMINISTRATIVE | DRG: 885 | End: 2023-04-27
Attending: EMERGENCY MEDICINE
Payer: MEDICAID

## 2023-04-27 ENCOUNTER — HOSPITAL ENCOUNTER (INPATIENT)
Facility: HOSPITAL | Age: 43
LOS: 7 days | Discharge: HOME | DRG: 885 | End: 2023-05-04
Attending: PSYCHIATRY & NEUROLOGY | Admitting: PSYCHIATRY & NEUROLOGY
Payer: MEDICAID

## 2023-04-27 VITALS
HEART RATE: 74 BPM | DIASTOLIC BLOOD PRESSURE: 66 MMHG | SYSTOLIC BLOOD PRESSURE: 110 MMHG | WEIGHT: 150 LBS | RESPIRATION RATE: 18 BRPM | BODY MASS INDEX: 20.32 KG/M2 | OXYGEN SATURATION: 100 % | TEMPERATURE: 97.9 F | HEIGHT: 72 IN

## 2023-04-27 DIAGNOSIS — R45.851 DEPRESSION WITH SUICIDAL IDEATION: Primary | ICD-10-CM

## 2023-04-27 DIAGNOSIS — F32.A DEPRESSION WITH SUICIDAL IDEATION: Primary | ICD-10-CM

## 2023-04-27 PROBLEM — F32.9 MDD (MAJOR DEPRESSIVE DISORDER): Status: ACTIVE | Noted: 2023-04-27

## 2023-04-27 LAB
ALBUMIN SERPL-MCNC: 3.8 G/DL (ref 3.5–5.2)
ALBUMIN/GLOB SERPL: 1.3 G/DL
ALP SERPL-CCNC: 79 U/L (ref 39–117)
ALT SERPL W P-5'-P-CCNC: 29 U/L (ref 1–41)
AMPHET+METHAMPHET UR QL: POSITIVE
AMPHETAMINES UR QL: POSITIVE
ANION GAP SERPL CALCULATED.3IONS-SCNC: 9.6 MMOL/L (ref 5–15)
AST SERPL-CCNC: 21 U/L (ref 1–40)
BARBITURATES UR QL SCN: NEGATIVE
BASOPHILS # BLD AUTO: 0.05 10*3/MM3 (ref 0–0.2)
BASOPHILS NFR BLD AUTO: 0.5 % (ref 0–1.5)
BENZODIAZ UR QL SCN: NEGATIVE
BILIRUB SERPL-MCNC: 0.2 MG/DL (ref 0–1.2)
BILIRUB UR QL STRIP: NEGATIVE
BUN SERPL-MCNC: 9 MG/DL (ref 6–20)
BUN/CREAT SERPL: 9.3 (ref 7–25)
BUPRENORPHINE SERPL-MCNC: NEGATIVE NG/ML
CALCIUM SPEC-SCNC: 8.7 MG/DL (ref 8.6–10.5)
CANNABINOIDS SERPL QL: NEGATIVE
CHLORIDE SERPL-SCNC: 104 MMOL/L (ref 98–107)
CLARITY UR: CLEAR
CO2 SERPL-SCNC: 27.4 MMOL/L (ref 22–29)
COCAINE UR QL: NEGATIVE
COLOR UR: YELLOW
CREAT SERPL-MCNC: 0.97 MG/DL (ref 0.76–1.27)
DEPRECATED RDW RBC AUTO: 46 FL (ref 37–54)
EGFRCR SERPLBLD CKD-EPI 2021: 99.3 ML/MIN/1.73
EOSINOPHIL # BLD AUTO: 0.17 10*3/MM3 (ref 0–0.4)
EOSINOPHIL NFR BLD AUTO: 1.7 % (ref 0.3–6.2)
ERYTHROCYTE [DISTWIDTH] IN BLOOD BY AUTOMATED COUNT: 13.7 % (ref 12.3–15.4)
ETHANOL BLD-MCNC: <10 MG/DL (ref 0–10)
ETHANOL UR QL: <0.01 %
FLUAV RNA RESP QL NAA+PROBE: NOT DETECTED
FLUBV RNA RESP QL NAA+PROBE: NOT DETECTED
GLOBULIN UR ELPH-MCNC: 2.9 GM/DL
GLUCOSE SERPL-MCNC: 143 MG/DL (ref 65–99)
GLUCOSE UR STRIP-MCNC: NEGATIVE MG/DL
HCT VFR BLD AUTO: 46.4 % (ref 37.5–51)
HGB BLD-MCNC: 14.9 G/DL (ref 13–17.7)
HGB UR QL STRIP.AUTO: NEGATIVE
IMM GRANULOCYTES # BLD AUTO: 0.03 10*3/MM3 (ref 0–0.05)
IMM GRANULOCYTES NFR BLD AUTO: 0.3 % (ref 0–0.5)
KETONES UR QL STRIP: NEGATIVE
LEUKOCYTE ESTERASE UR QL STRIP.AUTO: NEGATIVE
LYMPHOCYTES # BLD AUTO: 3.44 10*3/MM3 (ref 0.7–3.1)
LYMPHOCYTES NFR BLD AUTO: 34.3 % (ref 19.6–45.3)
MAGNESIUM SERPL-MCNC: 2 MG/DL (ref 1.6–2.6)
MCH RBC QN AUTO: 29.2 PG (ref 26.6–33)
MCHC RBC AUTO-ENTMCNC: 32.1 G/DL (ref 31.5–35.7)
MCV RBC AUTO: 90.8 FL (ref 79–97)
METHADONE UR QL SCN: NEGATIVE
MONOCYTES # BLD AUTO: 0.7 10*3/MM3 (ref 0.1–0.9)
MONOCYTES NFR BLD AUTO: 7 % (ref 5–12)
NEUTROPHILS NFR BLD AUTO: 5.65 10*3/MM3 (ref 1.7–7)
NEUTROPHILS NFR BLD AUTO: 56.2 % (ref 42.7–76)
NITRITE UR QL STRIP: NEGATIVE
NRBC BLD AUTO-RTO: 0 /100 WBC (ref 0–0.2)
OPIATES UR QL: NEGATIVE
OXYCODONE UR QL SCN: NEGATIVE
PCP UR QL SCN: NEGATIVE
PH UR STRIP.AUTO: 7.5 [PH] (ref 5–8)
PLATELET # BLD AUTO: 310 10*3/MM3 (ref 140–450)
PMV BLD AUTO: 8.7 FL (ref 6–12)
POTASSIUM SERPL-SCNC: 3.8 MMOL/L (ref 3.5–5.2)
PROPOXYPH UR QL: NEGATIVE
PROT SERPL-MCNC: 6.7 G/DL (ref 6–8.5)
PROT UR QL STRIP: NEGATIVE
QT INTERVAL: 386 MS
QTC INTERVAL: 436 MS
RBC # BLD AUTO: 5.11 10*6/MM3 (ref 4.14–5.8)
SARS-COV-2 RNA RESP QL NAA+PROBE: NOT DETECTED
SODIUM SERPL-SCNC: 141 MMOL/L (ref 136–145)
SP GR UR STRIP: 1.01 (ref 1–1.03)
TRICYCLICS UR QL SCN: NEGATIVE
UROBILINOGEN UR QL STRIP: NORMAL
WBC NRBC COR # BLD: 10.04 10*3/MM3 (ref 3.4–10.8)

## 2023-04-27 PROCEDURE — 82077 ASSAY SPEC XCP UR&BREATH IA: CPT | Performed by: PHYSICIAN ASSISTANT

## 2023-04-27 PROCEDURE — 80306 DRUG TEST PRSMV INSTRMNT: CPT | Performed by: PHYSICIAN ASSISTANT

## 2023-04-27 PROCEDURE — 87636 SARSCOV2 & INF A&B AMP PRB: CPT | Performed by: PHYSICIAN ASSISTANT

## 2023-04-27 PROCEDURE — 85025 COMPLETE CBC W/AUTO DIFF WBC: CPT | Performed by: PHYSICIAN ASSISTANT

## 2023-04-27 PROCEDURE — 83735 ASSAY OF MAGNESIUM: CPT | Performed by: PHYSICIAN ASSISTANT

## 2023-04-27 PROCEDURE — 81003 URINALYSIS AUTO W/O SCOPE: CPT | Performed by: PHYSICIAN ASSISTANT

## 2023-04-27 PROCEDURE — 93005 ELECTROCARDIOGRAM TRACING: CPT | Performed by: PSYCHIATRY & NEUROLOGY

## 2023-04-27 PROCEDURE — C9803 HOPD COVID-19 SPEC COLLECT: HCPCS

## 2023-04-27 PROCEDURE — 80053 COMPREHEN METABOLIC PANEL: CPT | Performed by: PHYSICIAN ASSISTANT

## 2023-04-27 PROCEDURE — 93010 ELECTROCARDIOGRAM REPORT: CPT | Performed by: INTERNAL MEDICINE

## 2023-04-27 PROCEDURE — 36415 COLL VENOUS BLD VENIPUNCTURE: CPT

## 2023-04-27 PROCEDURE — 99285 EMERGENCY DEPT VISIT HI MDM: CPT

## 2023-04-27 RX ORDER — LOPERAMIDE HYDROCHLORIDE 2 MG/1
2 CAPSULE ORAL
Status: DISCONTINUED | OUTPATIENT
Start: 2023-04-27 | End: 2023-05-04 | Stop reason: HOSPADM

## 2023-04-27 RX ORDER — BENZTROPINE MESYLATE 1 MG/1
2 TABLET ORAL ONCE AS NEEDED
Status: DISCONTINUED | OUTPATIENT
Start: 2023-04-27 | End: 2023-05-04 | Stop reason: HOSPADM

## 2023-04-27 RX ORDER — BENZTROPINE MESYLATE 1 MG/ML
1 INJECTION INTRAMUSCULAR; INTRAVENOUS ONCE AS NEEDED
Status: DISCONTINUED | OUTPATIENT
Start: 2023-04-27 | End: 2023-05-04 | Stop reason: HOSPADM

## 2023-04-27 RX ORDER — ACETAMINOPHEN 325 MG/1
650 TABLET ORAL EVERY 6 HOURS PRN
Status: DISCONTINUED | OUTPATIENT
Start: 2023-04-27 | End: 2023-05-04 | Stop reason: HOSPADM

## 2023-04-27 RX ORDER — ONDANSETRON 4 MG/1
4 TABLET, FILM COATED ORAL EVERY 6 HOURS PRN
Status: DISCONTINUED | OUTPATIENT
Start: 2023-04-27 | End: 2023-05-04 | Stop reason: HOSPADM

## 2023-04-27 RX ORDER — IBUPROFEN 400 MG/1
400 TABLET ORAL EVERY 6 HOURS PRN
Status: DISCONTINUED | OUTPATIENT
Start: 2023-04-27 | End: 2023-05-04 | Stop reason: HOSPADM

## 2023-04-27 RX ORDER — FAMOTIDINE 20 MG/1
20 TABLET, FILM COATED ORAL 2 TIMES DAILY PRN
Status: DISCONTINUED | OUTPATIENT
Start: 2023-04-27 | End: 2023-05-04 | Stop reason: HOSPADM

## 2023-04-27 RX ORDER — HYDROXYZINE 50 MG/1
50 TABLET, FILM COATED ORAL EVERY 6 HOURS PRN
Status: DISCONTINUED | OUTPATIENT
Start: 2023-04-27 | End: 2023-05-04 | Stop reason: HOSPADM

## 2023-04-27 RX ORDER — ECHINACEA PURPUREA EXTRACT 125 MG
2 TABLET ORAL AS NEEDED
Status: DISCONTINUED | OUTPATIENT
Start: 2023-04-27 | End: 2023-05-04 | Stop reason: HOSPADM

## 2023-04-27 RX ORDER — NICOTINE 21 MG/24HR
1 PATCH, TRANSDERMAL 24 HOURS TRANSDERMAL
Status: DISCONTINUED | OUTPATIENT
Start: 2023-04-27 | End: 2023-05-04 | Stop reason: HOSPADM

## 2023-04-27 RX ORDER — BENZONATATE 100 MG/1
100 CAPSULE ORAL 3 TIMES DAILY PRN
Status: DISCONTINUED | OUTPATIENT
Start: 2023-04-27 | End: 2023-05-04 | Stop reason: HOSPADM

## 2023-04-27 RX ORDER — TRAZODONE HYDROCHLORIDE 50 MG/1
50 TABLET ORAL NIGHTLY PRN
Status: DISCONTINUED | OUTPATIENT
Start: 2023-04-27 | End: 2023-04-28

## 2023-04-27 RX ORDER — ALUMINA, MAGNESIA, AND SIMETHICONE 2400; 2400; 240 MG/30ML; MG/30ML; MG/30ML
15 SUSPENSION ORAL EVERY 6 HOURS PRN
Status: DISCONTINUED | OUTPATIENT
Start: 2023-04-27 | End: 2023-05-04 | Stop reason: HOSPADM

## 2023-04-27 RX ADMIN — NICOTINE TRANSDERMAL SYSTEM 1 PATCH: 21 PATCH, EXTENDED RELEASE TRANSDERMAL at 17:43

## 2023-04-27 NOTE — NURSING NOTE
At this time the patient is stating to staff if I am not admitted here I am going to fucking kill myself and it will be your fault. I am mental and need help. Instructed him that I would relay the information to the provider when I call him.     Last admission was January 2023
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
## 2023-04-27 NOTE — ED PROVIDER NOTES
Subjective   History of Present Illness  42-year-old male presents secondary to depression with suicidal ideation.  Patient states is progressively worsened recently.  He has a history of depression in the past.  He has had multiple plans of killing himself however is not tried any of these.  He states that he does use drugs.  He states he uses these to make him feel better.  He has a remote history of diabetes.  He has no other medical history.  He arrived by police.        Review of Systems   Constitutional: Negative.  Negative for fever.   HENT: Negative.    Respiratory: Negative.    Cardiovascular: Negative.  Negative for chest pain.   Gastrointestinal: Negative.  Negative for abdominal pain.   Endocrine: Negative.    Genitourinary: Negative.  Negative for dysuria.   Skin: Negative.    Neurological: Negative.    Psychiatric/Behavioral: Positive for suicidal ideas.   All other systems reviewed and are negative.      Past Medical History:   Diagnosis Date   • Antisocial personality disorder    • Anxiety    • Bipolar disorder    • Depression    • Hepatitis C    • Multiple personalities    • PTSD (post-traumatic stress disorder)    • Substance abuse    • TBI (traumatic brain injury)        No Known Allergies    Past Surgical History:   Procedure Laterality Date   • LEG SURGERY Left        Family History   Family history unknown: Yes       Social History     Socioeconomic History   • Marital status: Legally    • Number of children: 3   • Years of education: 2nd   • Highest education level: 2nd grade   Tobacco Use   • Smoking status: Every Day     Packs/day: 1.00     Years: 25.00     Pack years: 25.00     Types: Cigarettes   • Smokeless tobacco: Former     Types: Chew, Snuff   • Tobacco comments:     Denies current use    Vaping Use   • Vaping Use: Some days   • Substances: Nicotine, Flavoring   • Devices: Disposable, Pre-filled or refillable cartridge, Pre-filled pod   Substance and Sexual Activity   •  Alcohol use: Yes     Comment: occasioanl   • Drug use: Yes     Types: Marijuana   • Sexual activity: Not Currently     Partners: Female           Objective   Physical Exam  Vitals and nursing note reviewed.   Constitutional:       General: He is not in acute distress.     Appearance: He is well-developed. He is not diaphoretic.   HENT:      Head: Normocephalic and atraumatic.      Right Ear: External ear normal.      Left Ear: External ear normal.      Nose: Nose normal.   Eyes:      Conjunctiva/sclera: Conjunctivae normal.      Pupils: Pupils are equal, round, and reactive to light.   Neck:      Vascular: No JVD.      Trachea: No tracheal deviation.   Cardiovascular:      Rate and Rhythm: Normal rate and regular rhythm.      Heart sounds: Normal heart sounds. No murmur heard.  Pulmonary:      Effort: Pulmonary effort is normal. No respiratory distress.      Breath sounds: Normal breath sounds. No wheezing.   Abdominal:      General: Bowel sounds are normal.      Palpations: Abdomen is soft.      Tenderness: There is no abdominal tenderness.   Musculoskeletal:         General: No deformity. Normal range of motion.      Cervical back: Normal range of motion and neck supple.   Skin:     General: Skin is warm and dry.      Coloration: Skin is not pale.      Findings: No erythema or rash.   Neurological:      Mental Status: He is alert and oriented to person, place, and time.      Cranial Nerves: No cranial nerve deficit.   Psychiatric:         Behavior: Behavior normal.         Thought Content: Thought content includes suicidal ideation. Thought content includes suicidal plan.         Procedures           ED Course           Results for orders placed or performed during the hospital encounter of 04/27/23   COVID-19 and FLU A/B PCR - Swab, Nasopharynx    Specimen: Nasopharynx; Swab   Result Value Ref Range    COVID19 Not Detected Not Detected - Ref. Range    Influenza A PCR Not Detected Not Detected    Influenza B PCR  Not Detected Not Detected   Comprehensive Metabolic Panel    Specimen: Arm, Right; Blood   Result Value Ref Range    Glucose 143 (H) 65 - 99 mg/dL    BUN 9 6 - 20 mg/dL    Creatinine 0.97 0.76 - 1.27 mg/dL    Sodium 141 136 - 145 mmol/L    Potassium 3.8 3.5 - 5.2 mmol/L    Chloride 104 98 - 107 mmol/L    CO2 27.4 22.0 - 29.0 mmol/L    Calcium 8.7 8.6 - 10.5 mg/dL    Total Protein 6.7 6.0 - 8.5 g/dL    Albumin 3.8 3.5 - 5.2 g/dL    ALT (SGPT) 29 1 - 41 U/L    AST (SGOT) 21 1 - 40 U/L    Alkaline Phosphatase 79 39 - 117 U/L    Total Bilirubin 0.2 0.0 - 1.2 mg/dL    Globulin 2.9 gm/dL    A/G Ratio 1.3 g/dL    BUN/Creatinine Ratio 9.3 7.0 - 25.0    Anion Gap 9.6 5.0 - 15.0 mmol/L    eGFR 99.3 >60.0 mL/min/1.73   Urinalysis With Microscopic If Indicated (No Culture) - Urine, Clean Catch    Specimen: Urine, Clean Catch   Result Value Ref Range    Color, UA Yellow Yellow, Straw    Appearance, UA Clear Clear    pH, UA 7.5 5.0 - 8.0    Specific Gravity, UA 1.011 1.005 - 1.030    Glucose, UA Negative Negative    Ketones, UA Negative Negative    Bilirubin, UA Negative Negative    Blood, UA Negative Negative    Protein, UA Negative Negative    Leuk Esterase, UA Negative Negative    Nitrite, UA Negative Negative    Urobilinogen, UA 0.2 E.U./dL 0.2 - 1.0 E.U./dL   Ethanol    Specimen: Blood   Result Value Ref Range    Ethanol <10 0 - 10 mg/dL    Ethanol % <0.010 %   Magnesium    Specimen: Blood   Result Value Ref Range    Magnesium 2.0 1.6 - 2.6 mg/dL   CBC Auto Differential    Specimen: Arm, Right; Blood   Result Value Ref Range    WBC 10.04 3.40 - 10.80 10*3/mm3    RBC 5.11 4.14 - 5.80 10*6/mm3    Hemoglobin 14.9 13.0 - 17.7 g/dL    Hematocrit 46.4 37.5 - 51.0 %    MCV 90.8 79.0 - 97.0 fL    MCH 29.2 26.6 - 33.0 pg    MCHC 32.1 31.5 - 35.7 g/dL    RDW 13.7 12.3 - 15.4 %    RDW-SD 46.0 37.0 - 54.0 fl    MPV 8.7 6.0 - 12.0 fL    Platelets 310 140 - 450 10*3/mm3    Neutrophil % 56.2 42.7 - 76.0 %    Lymphocyte % 34.3 19.6 - 45.3 %     Monocyte % 7.0 5.0 - 12.0 %    Eosinophil % 1.7 0.3 - 6.2 %    Basophil % 0.5 0.0 - 1.5 %    Immature Grans % 0.3 0.0 - 0.5 %    Neutrophils, Absolute 5.65 1.70 - 7.00 10*3/mm3    Lymphocytes, Absolute 3.44 (H) 0.70 - 3.10 10*3/mm3    Monocytes, Absolute 0.70 0.10 - 0.90 10*3/mm3    Eosinophils, Absolute 0.17 0.00 - 0.40 10*3/mm3    Basophils, Absolute 0.05 0.00 - 0.20 10*3/mm3    Immature Grans, Absolute 0.03 0.00 - 0.05 10*3/mm3    nRBC 0.0 0.0 - 0.2 /100 WBC   Urine Drug Screen - Urine, Clean Catch    Specimen: Urine, Clean Catch   Result Value Ref Range    THC, Screen, Urine Negative Negative    Phencyclidine (PCP), Urine Negative Negative    Cocaine Screen, Urine Negative Negative    Methamphetamine, Ur Positive (A) Negative    Opiate Screen Negative Negative    Amphetamine Screen, Urine Positive (A) Negative    Benzodiazepine Screen, Urine Negative Negative    Tricyclic Antidepressants Screen Negative Negative    Methadone Screen, Urine Negative Negative    Barbiturates Screen, Urine Negative Negative    Oxycodone Screen, Urine Negative Negative    Propoxyphene Screen Negative Negative    Buprenorphine, Screen, Urine Negative Negative                                     Medical Decision Making  42-year-old male presents secondary to depression with suicidal ideation.  Patient states is progressively worsened recently.  He has a history of depression in the past.  He has had multiple plans of killing himself however is not tried any of these.  He states that he does use drugs.  He states he uses these to make him feel better.  He has a remote history of diabetes.  He has no other medical history.  He arrived by police.    Depression with suicidal ideation: complicated acute illness or injury  Amount and/or Complexity of Data Reviewed  Labs: ordered. Decision-making details documented in ED Course.  Discussion of management or test interpretation with external provider(s): On-call psychiatrist via the intake  nurse    Risk  Risk Details: It was felt that patient would benefit from inpatient therapy/treatment.  Patient is agreeable to admission.        Final diagnoses:   Depression with suicidal ideation       ED Disposition  ED Disposition     ED Disposition   DC/Transfer to Behavioral Health    [facility]   --    Comment   --             No follow-up provider specified.       Medication List      No changes were made to your prescriptions during this visit.          Jose Vo PA  04/27/23 1518

## 2023-04-27 NOTE — NURSING NOTE
NEW ADMIT FROM ER, ADMITTED WITH SUICIDAL THOUGHTS TO CUT HIS THROAT WITH A POCKET KNIFE. ER STAFF SENT THE CLIENT'S POCKET KNIFE TO "[employer] [facility]. THE CLIENT RATED ANXIETY 10 AND DEPRESSION 10, BOTH ON A SCALE OF 0-10. THE CLIENT REPORTS THAT HE HAS BEEN HOMELESS FOR 2 WEEKS AND WAS STAYING WITH FRIENDS PRIOR. THE CLIENT IS EVASIVE ABOUT SUBSTANCE USE BUT DOES REPORT THAT HE ABUSES DRUGS.

## 2023-04-28 PROCEDURE — 99223 1ST HOSP IP/OBS HIGH 75: CPT | Performed by: PSYCHIATRY & NEUROLOGY

## 2023-04-28 RX ORDER — LURASIDONE HYDROCHLORIDE 40 MG/1
20 TABLET, FILM COATED ORAL DAILY
Status: DISCONTINUED | OUTPATIENT
Start: 2023-04-28 | End: 2023-05-04 | Stop reason: HOSPADM

## 2023-04-28 RX ORDER — DIVALPROEX SODIUM 500 MG/1
500 TABLET, EXTENDED RELEASE ORAL NIGHTLY
Status: DISCONTINUED | OUTPATIENT
Start: 2023-04-28 | End: 2023-05-02

## 2023-04-28 RX ORDER — MIRTAZAPINE 15 MG/1
15 TABLET, FILM COATED ORAL NIGHTLY
Status: DISCONTINUED | OUTPATIENT
Start: 2023-04-28 | End: 2023-05-04 | Stop reason: HOSPADM

## 2023-04-28 RX ADMIN — LURASIDONE HYDROCHLORIDE 20 MG: 40 TABLET, FILM COATED ORAL at 11:59

## 2023-04-28 RX ADMIN — MIRTAZAPINE 15 MG: 15 TABLET, FILM COATED ORAL at 20:42

## 2023-04-28 RX ADMIN — DIVALPROEX SODIUM 500 MG: 500 TABLET, EXTENDED RELEASE ORAL at 20:42

## 2023-04-28 NOTE — PLAN OF CARE
Goal Outcome Evaluation:  Plan of Care Reviewed With: patient  Patient Agreement with Plan of Care: agrees     Progress: no change  Outcome Evaluation: Patient calm, spent most of shift in room sleeping. Came to day room for snack.
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
## 2023-04-28 NOTE — PLAN OF CARE
Problem: Adult Behavioral Health Plan of Care  Goal: Plan of Care Review  Outcome: Ongoing, Progressing  Flowsheets (Taken 4/28/2023 1436)  Consent Given to Review Plan with: NA  Progress: no change  Plan of Care Reviewed With: patient  Patient Agreement with Plan of Care: agrees  Outcome Evaluation: New admit. Completed social history and integrated summary  Goal: Patient-Specific Goal (Individualization)  Outcome: Ongoing, Progressing  Flowsheets  Taken 4/28/2023 1436  Patient-Specific Goals (Include Timeframe): Patient will deny SI/HI prior to discharge. Patient will identify 1 healthy coping skill for illness prior to discharge. Paitent will consent to residential referral prior to discharge.  Individualized Care Needs: Therapist will offer 1-4 individual sessions, family education, aftercare planning  Anxieties, Fears or Concerns: None  Taken 4/28/2023 1430  Patient Personal Strengths:   resilient   resourceful   spiritual/Latter-day support  Patient Vulnerabilities:   adverse childhood experience(s)   history of unsuccessful treatment   limited support system   substance abuse/addiction   poor impulse control   occupational insecurity   limited social skills   lacks insight into illness   food insecurity   family/relationship conflict   housing insecurity  Goal: Optimized Coping Skills in Response to Life Stressors  Outcome: Ongoing, Progressing  Intervention: Promote Effective Coping Strategies  Flowsheets (Taken 4/28/2023 1436)  Supportive Measures:   active listening utilized   counseling provided  Goal: Develops/Participates in Therapeutic [place] to Support Successful Transition  Outcome: Ongoing, Progressing  Intervention: Foster Therapeutic [place]  Flowsheets (Taken 4/28/2023 1436)  Trust Relationship/Rapport:   care explained   empathic listening provided   reassurance provided   thoughts/feelings acknowledged   questions answered   questions encouraged   emotional support provided   choices  provided  Intervention: Mutually Develop Transition Plan  Flowsheets  Taken 4/28/2023 1436  Outpatient/Agency/Support Group Needs: residential services  Transition Support:   community resources reviewed   crisis management plan verbalized   follow-up care coordinated   crisis management plan promoted   follow-up care discussed  Anticipated Discharge Disposition: residential substance use unit  Taken 4/28/2023 1434  Discharge Coordination/Progress: Patient has [facility] plan.  He is unable to participate in aftercare planning this date. Has been asked to consider rehab  Concerns Comments: NA  Transportation Anticipated: public transportation  Transportation Concerns: no car  Current Discharge Risk:   psychiatric illness   substance use/abuse  Concerns to be Addressed:   mental health   coping/stress   compliance issue   cognitive/perceptual   discharge planning   substance/tobacco abuse/use   homelessness   suicidal   medication   homicidal  Readmission Within the Last 30 Days: no previous admission in last 30 days  Patient/Family Anticipated Services at Transition: rehabilitation services  Patient's Choice of Community Agency(s): Patient refuses to participate in aftercare planning this date.  Patient/Family Anticipates Transition to: inpatient rehabilitation facility  Offered/Gave Vendor List: no   Goal Outcome Evaluation:  Plan of Care Reviewed With: patient  Patient Agreement with Plan of Care: agrees  Consent Given to Review Plan with: NA  Progress: no change  Outcome Evaluation: New admit. Completed social history and integrated summary

## 2023-04-28 NOTE — PLAN OF CARE
Goal Outcome Evaluation:  Plan of Care Reviewed With: patient  Patient Agreement with Plan of Care: agrees  Consent Given to Review Plan with: pt no complaints this shift. pt isolate to room.  no complaints

## 2023-04-29 PROCEDURE — 99232 SBSQ HOSP IP/OBS MODERATE 35: CPT | Performed by: PSYCHIATRY & NEUROLOGY

## 2023-04-29 RX ADMIN — MIRTAZAPINE 15 MG: 15 TABLET, FILM COATED ORAL at 21:02

## 2023-04-29 RX ADMIN — DIVALPROEX SODIUM 500 MG: 500 TABLET, EXTENDED RELEASE ORAL at 21:02

## 2023-04-29 RX ADMIN — LURASIDONE HYDROCHLORIDE 20 MG: 40 TABLET, FILM COATED ORAL at 08:58

## 2023-04-29 NOTE — PLAN OF CARE
Goal Outcome Evaluation:  Plan of Care Reviewed With: patient  Patient Agreement with Plan of Care: agrees     Progress: no change  Outcome Evaluation: Patient was irritable and argumentative, rates anxiety and depression 10/10, reports poor sleep and appetite, denies SI/HI/AVH, complained about snack and drink options, meds, etc, appears to have slept through the night.

## 2023-04-29 NOTE — PLAN OF CARE
Goal Outcome Evaluation:  Plan of Care Reviewed With: patient  Patient Agreement with Plan of Care: agrees     Progress: improving  Outcome Evaluation: pt. rates anxiety /depression 10 denies s/i he verbalzies there is nothing here anyway to do anything he denies  wanting to hurt others irritable manner denies c/w staying in bed .

## 2023-04-29 NOTE — PROGRESS NOTES
"INPATIENT PSYCHIATRIC PROGRESS NOTE    Name:  Alexis Guthrie  :  1980  MRN:  [id number]  Visit Number:  [id number]  Length of stay:  2    SUBJECTIVE    CC/Focus of Exam: bipolar disorder    INTERVAL HISTORY:  The patient reports he is not any better but was able to sleep good last night. He states he has had suicidal thoughts for 25 years non-stop.   Depression rating 10/10  Anxiety rating 10/10  Sleep: good  Withdrawal sx: denies  Cravin/10    Review of Systems   Respiratory: Negative.    Cardiovascular: Negative.    Musculoskeletal: Positive for arthralgias.   Psychiatric/Behavioral: Positive for dysphoric mood and suicidal ideas. The patient is nervous/anxious.        OBJECTIVE    Temp:  [97.2 °F (36.2 °C)-98.8 °F (37.1 °C)] 98.1 °F (36.7 °C)  Heart Rate:  [] 84  Resp:  [18] 18  BP: (115-128)/(67-80) 128/74    MENTAL STATUS EXAM:  Appearance:Casually dressed, good hygeine.   Cooperation:Cooperative  Psychomotor: No psychomotor agitation/retardation, No EPS, No motor tics  Speech-normal rate, amount.  Mood \"irritable\"   Affect-congruent, appropriate, stable  Thought Content-goal directed, no delusional material present  Thought process-linear, organized.  Suicidality: No SI  Homicidality: No HI  Perception: No AH/VH  Insight-fair   Judgement-fair    Lab Results (last 24 hours)     ** No results found for the last 24 hours. **             Imaging Results (Last 24 Hours)     ** No results found for the last 24 hours. **             ECG/EMG Results (most recent)     Procedure Component Value Units Date/Time    ECG 12 Lead Other [[id number]] Collected: 23 173     Updated: 23     QT Interval 386 ms      QTC Interval 436 ms     Narrative:      Test Reason : Baseline Cardiac Status~  Blood Pressure :   */*   mmHG  Vent. Rate :  77 BPM     Atrial Rate :  77 BPM     P-R Int : 116 ms          QRS Dur : 102 ms      QT Int : 386 ms       P-R-T Axes :  66  73  84 degrees     QTc Int : 436 " ms    Normal sinus rhythm  Normal ECG  When compared with ECG of 27-DEC-2022 04:20,  Nonspecific T wave abnormality now evident in Lateral leads  Confirmed by Eusebio Montgomery ([id number]) on 4/27/2023 5:53:54 PM    Referred By: EV           Confirmed By: Eusebio Montgomery           ALLERGIES: Patient has no known allergies.    Medication Review:   Scheduled Medications:  divalproex, 500 mg, Oral, Nightly  lurasidone, 20 mg, Oral, Daily  mirtazapine, 15 mg, Oral, Nightly  nicotine, 1 patch, Transdermal, Q24H         PRN Medications:  •  acetaminophen  •  aluminum-magnesium hydroxide-simethicone  •  benzonatate  •  benztropine **OR** benztropine  •  famotidine  •  hydrOXYzine  •  ibuprofen  •  loperamide  •  magnesium hydroxide  •  ondansetron  •  sodium chloride   All medications reviewed.    ASSESSMENT & PLAN:      Bipolar 2 disorder, major depressive episode  -Continue Depakote  mg po hs  -Continue Latuda 20 mg daily  -Continue Remeron 15 mg bed time       Suicidal ideations  -SP3       Homicidal ideations  -Patient reports thoughts of harming some people but wouldn't describe who. Will continue to monitor. His ongoing methamphetamine use may be contributing to paranoia.        PTSD (post-traumatic stress disorder)  -Medications as above  -Outpatient referral for psychotherapy       Hepatitis C  -Patient reports no previous treatments  -Refer to outpatient treatment        Methamphetamine use disorder severe  -Supportive treatment       Nicotine use disorder  -Nicotine replacement    Special precautions: Special Precautions Level 3 (q15 min checks) .    Behavioral Health Treatment Plan and Problem List: I have reviewed and approved the Behavioral Health Treatment Plan and Problem list.  The patient has had a chance to review and agrees with the treatment plan.    Copied text in portions of this note has been reviewed and is accurate as of 04/29/23         Clinician:  Nuha Gregory MD  04/29/23  13:25 EDT

## 2023-04-30 PROCEDURE — 99232 SBSQ HOSP IP/OBS MODERATE 35: CPT | Performed by: PSYCHIATRY & NEUROLOGY

## 2023-04-30 RX ORDER — EMOLLIENT COMBINATION NO.92
LOTION (ML) TOPICAL
Status: DISCONTINUED | OUTPATIENT
Start: 2023-04-30 | End: 2023-05-04 | Stop reason: HOSPADM

## 2023-04-30 RX ADMIN — DIVALPROEX SODIUM 500 MG: 500 TABLET, EXTENDED RELEASE ORAL at 20:29

## 2023-04-30 RX ADMIN — HYDROXYZINE HYDROCHLORIDE 50 MG: 50 TABLET ORAL at 20:29

## 2023-04-30 RX ADMIN — MIRTAZAPINE 15 MG: 15 TABLET, FILM COATED ORAL at 20:29

## 2023-04-30 NOTE — PLAN OF CARE
"Goal Outcome Evaluation:  Plan of Care Reviewed With: patient  Patient Agreement with Plan of Care: agrees     Progress: improving  Outcome Evaluation: Pt cooperative during assessment. Anx and dep 10. Reported he was SI, when asked about a plan he stated \"I'd just like to die.\" Reports waking up frequently and sweating.  "
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
## 2023-04-30 NOTE — PROGRESS NOTES
"INPATIENT PSYCHIATRIC PROGRESS NOTE    Name:  Alexis Guthrie  :  1980  MRN:  [id number]  Visit Number:  [id number]  Length of stay:  3    SUBJECTIVE    CC/Focus of Exam: bipolar disorder    INTERVAL HISTORY:  The patient reports he is feeling better but not by much. Reports his hands and feet are dry.     Depression rating 10/10  Anxiety rating 10/10  Sleep: good  Withdrawal sx: denies  Cravin/10    Review of Systems   Respiratory: Negative.    Cardiovascular: Negative.    Musculoskeletal: Positive for arthralgias.   Psychiatric/Behavioral: Positive for dysphoric mood and suicidal ideas. The patient is nervous/anxious.        OBJECTIVE    Temp:  [96.6 °F (35.9 °C)-98.5 °F (36.9 °C)] 98.5 °F (36.9 °C)  Heart Rate:  [89-98] 89  Resp:  [18] 18  BP: (109-110)/(65-70) 109/65    MENTAL STATUS EXAM:  Appearance:Casually dressed, good hygeine.   Cooperation:Cooperative  Psychomotor: No psychomotor agitation/retardation, No EPS, No motor tics  Speech-normal rate, amount.  Mood \"irritable\"   Affect-congruent, appropriate, stable  Thought Content-goal directed, no delusional material present  Thought process-linear, organized.  Suicidality: No SI  Homicidality: No HI  Perception: No AH/VH  Insight-fair   Judgement-fair    Lab Results (last 24 hours)     ** No results found for the last 24 hours. **             Imaging Results (Last 24 Hours)     ** No results found for the last 24 hours. **             ECG/EMG Results (most recent)     Procedure Component Value Units Date/Time    ECG 12 Lead Other [[id number]] Collected: 23     Updated: 23     QT Interval 386 ms      QTC Interval 436 ms     Narrative:      Test Reason : Baseline Cardiac Status~  Blood Pressure :   */*   mmHG  Vent. Rate :  77 BPM     Atrial Rate :  77 BPM     P-R Int : 116 ms          QRS Dur : 102 ms      QT Int : 386 ms       P-R-T Axes :  66  73  84 degrees     QTc Int : 436 ms    Normal sinus rhythm  Normal ECG  When compared " with ECG of 27-DEC-2022 04:20,  Nonspecific T wave abnormality now evident in Lateral leads  Confirmed by Eusebio Montgomery ([id number]) on 4/27/2023 5:53:54 PM    Referred By: EV           Confirmed By: Eusebio Montgomery           ALLERGIES: Patient has no known allergies.    Medication Review:   Scheduled Medications:  divalproex, 500 mg, Oral, Nightly  lurasidone, 20 mg, Oral, Daily  mirtazapine, 15 mg, Oral, Nightly  nicotine, 1 patch, Transdermal, Q24H         PRN Medications:  •  acetaminophen  •  aluminum-magnesium hydroxide-simethicone  •  benzonatate  •  benztropine **OR** benztropine  •  famotidine  •  hydrOXYzine  •  ibuprofen  •  loperamide  •  magnesium hydroxide  •  ondansetron  •  sodium chloride   All medications reviewed.    ASSESSMENT & PLAN:      Bipolar 2 disorder, major depressive episode  -Continue Depakote  mg po hs  -Continue Latuda 20 mg daily  -Continue Remeron 15 mg bed time       Suicidal ideations  -SP3       Homicidal ideations  -Patient reports thoughts of harming some people but wouldn't describe who. Will continue to monitor. His ongoing methamphetamine use may be contributing to paranoia.        PTSD (post-traumatic stress disorder)  -Medications as above  -Outpatient referral for psychotherapy       Hepatitis C  -Patient reports no previous treatments  -Refer to outpatient treatment        Methamphetamine use disorder severe  -Supportive treatment       Nicotine use disorder  -Nicotine replacement      Dry skin  -Lubrisoft lotion    Special precautions: Special Precautions Level 3 (q15 min checks) .    Behavioral Health Treatment Plan and Problem List: I have reviewed and approved the Behavioral Health Treatment Plan and Problem list.  The patient has had a chance to review and agrees with the treatment plan.    Copied text in portions of this note has been reviewed and is accurate as of 04/30/23         Clinician:  Nuha Gregory MD  04/30/23  16:11 EDT

## 2023-05-01 LAB — VALPROATE SERPL-MCNC: 36.9 MCG/ML (ref 50–125)

## 2023-05-01 PROCEDURE — 80164 ASSAY DIPROPYLACETIC ACD TOT: CPT | Performed by: PSYCHIATRY & NEUROLOGY

## 2023-05-01 PROCEDURE — 99232 SBSQ HOSP IP/OBS MODERATE 35: CPT | Performed by: PSYCHIATRY & NEUROLOGY

## 2023-05-01 RX ADMIN — DIVALPROEX SODIUM 500 MG: 500 TABLET, EXTENDED RELEASE ORAL at 21:13

## 2023-05-01 RX ADMIN — HYDROXYZINE HYDROCHLORIDE 50 MG: 50 TABLET ORAL at 13:45

## 2023-05-01 RX ADMIN — MIRTAZAPINE 15 MG: 15 TABLET, FILM COATED ORAL at 21:14

## 2023-05-01 RX ADMIN — LURASIDONE HYDROCHLORIDE 20 MG: 40 TABLET, FILM COATED ORAL at 12:04

## 2023-05-01 NOTE — NURSING NOTE
Spoke with PT. PT states he has breakage under left great toe. Has been there for a few days. No bleeding that he is aware of. It does hurt at times. New order noted by Dr. Gregory to schedule an appointment with Outpt. Podiatry clinic. [id number]. Called [facility], they will fax referral form that has to be filled out by Dr. Gregory, and will need to be faxed back in order for them to make the apptm due to PT's insurance [employer] [place].

## 2023-05-01 NOTE — PHARMACY PATIENT ASSISTANCE
Pharmacy checked on price of Latuda 20 mg initiated inpatient. Per patient's plan, copay will be $0 for 1 month supply. No other issues identified at this time.    Thank you,    Nadira Briceño, PharmD  05/01/23  10:27 EDT

## 2023-05-01 NOTE — PLAN OF CARE
Goal Outcome Evaluation:  Plan of Care Reviewed With: patient  Patient Agreement with Plan of Care: agrees     Progress: improving  Outcome Evaluation: PT refused earlier to give social security number to nurse attempting to make a podiatrist aptm for him. Later gave number. Rates anxiety and depression 7/10. States he is SI, and HI. Refused to comment on any hallucinations. States sleeps o.k., and appetite is good.

## 2023-05-01 NOTE — PROGRESS NOTES
"INPATIENT PSYCHIATRIC PROGRESS NOTE    Name:  Alexis Guthrie  :  1980  MRN:  [id number]  Visit Number:  [id number]  Length of stay:  4    SUBJECTIVE    CC/Focus of Exam: bipolar disorder    INTERVAL HISTORY:  The patient reports he is feeling some better but his left foot is hurting. He has chronic pain in his left foot and has had surgery on it. No swelling or deformity noted. He agreed to follow up with a foot doctor outpatient. He states he doesn't have a place to go to. He denies suicidal ideations at this time but reported them earlier to the nursing staff.   Depression rating 10/10  Anxiety rating 10/10  Sleep: good  Withdrawal sx: denies  Cravin/10    Review of Systems   Respiratory: Negative.    Cardiovascular: Negative.    Musculoskeletal: Positive for arthralgias.   Psychiatric/Behavioral: Positive for dysphoric mood and suicidal ideas. The patient is nervous/anxious.        OBJECTIVE    Temp:  [97.1 °F (36.2 °C)-97.9 °F (36.6 °C)] 97.1 °F (36.2 °C)  Heart Rate:  [88-99] 88  Resp:  [18] 18  BP: ()/(55-83) 140/83    MENTAL STATUS EXAM:  Appearance:Casually dressed, good hygeine.   Cooperation:Cooperative  Psychomotor: No psychomotor agitation/retardation, No EPS, No motor tics  Speech-normal rate, amount.  Mood \"irritable\"   Affect-congruent, appropriate, stable  Thought Content-goal directed, no delusional material present  Thought process-linear, organized.  Suicidality: No SI  Homicidality: No HI  Perception: No AH/VH  Insight-fair   Judgement-fair    Lab Results (last 24 hours)     ** No results found for the last 24 hours. **             Imaging Results (Last 24 Hours)     ** No results found for the last 24 hours. **             ECG/EMG Results (most recent)     Procedure Component Value Units Date/Time    ECG 12 Lead Other [[id number]] Collected: 23     Updated: 23     QT Interval 386 ms      QTC Interval 436 ms     Narrative:      Test Reason : Baseline Cardiac " Status~  Blood Pressure :   */*   mmHG  Vent. Rate :  77 BPM     Atrial Rate :  77 BPM     P-R Int : 116 ms          QRS Dur : 102 ms      QT Int : 386 ms       P-R-T Axes :  66  73  84 degrees     QTc Int : 436 ms    Normal sinus rhythm  Normal ECG  When compared with ECG of 27-DEC-2022 04:20,  Nonspecific T wave abnormality now evident in Lateral leads  Confirmed by Eusebio Montgomery ([id number]) on 4/27/2023 5:53:54 PM    Referred By: EV           Confirmed By: Eusebio Montgomery           ALLERGIES: Patient has no known allergies.    Medication Review:   Scheduled Medications:  divalproex, 500 mg, Oral, Nightly  lurasidone, 20 mg, Oral, Daily  mirtazapine, 15 mg, Oral, Nightly  nicotine, 1 patch, Transdermal, Q24H         PRN Medications:  •  acetaminophen  •  aluminum-magnesium hydroxide-simethicone  •  benzonatate  •  benztropine **OR** benztropine  •  famotidine  •  hydrOXYzine  •  ibuprofen  •  loperamide  •  lubrisoft  •  magnesium hydroxide  •  ondansetron  •  sodium chloride   All medications reviewed.    ASSESSMENT & PLAN:      Bipolar 2 disorder, major depressive episode  -Continue Depakote  mg po hs, check level  -Continue Latuda 20 mg daily  -Continue Remeron 15 mg bed time       Suicidal ideations  -SP3       Homicidal ideations  -Patient reports thoughts of harming some people but wouldn't describe who. Will continue to monitor. His ongoing methamphetamine use may be contributing to paranoia.        PTSD (post-traumatic stress disorder)  -Medications as above  -Outpatient referral for psychotherapy       Hepatitis C  -Patient reports no previous treatments  -Refer to outpatient treatment        Methamphetamine use disorder severe  -Supportive treatment       Nicotine use disorder  -Nicotine replacement      Dry skin  -Lubrisoft lotion      Left ankle pain  -Outpatient podiatry referral    Special precautions: Special Precautions Level 3 (q15 min checks) .    Behavioral Health Treatment Plan and Problem  List: I have reviewed and approved the Behavioral Health Treatment Plan and Problem list.  The patient has had a chance to review and agrees with the treatment plan.    Copied text in portions of this note has been reviewed and is accurate as of 05/01/23         Clinician:  Nuha Gregory MD  05/01/23  10:18 EDT

## 2023-05-01 NOTE — PLAN OF CARE
Problem: Adult Behavioral Health Plan of Care  Goal: Plan of Care Review  Outcome: Ongoing, Progressing  Flowsheets (Taken 5/1/2023 1122)  Progress: improving  Plan of Care Reviewed With: patient  Patient Agreement with Plan of Care: unable to participate  Goal: Develops/Participates in Therapeutic [place] to Support Successful Transition  Outcome: Ongoing, Progressing      1100:     Therapist attempted to see patient for follow up of Bipolar 2 disorder, major depressive episode. Suicidal ideations. Homicidal ideations. PTSD. Methamphetamine use disorder severe. Staffed case with Dr. Gregory.     Patient sound asleep and difficult to wake. No distress noted.  Patient minimized need to meet with therapist on Friday. He was encouraged to meet with this patient to develop a discharge safety and aftercare plan.  Therapist will be available today if patient is agreeable to meet later.  Therapist recommends residential [facility] referral.

## 2023-05-01 NOTE — PLAN OF CARE
Goal Outcome Evaluation:  Plan of Care Reviewed With: patient  Patient Agreement with Plan of Care: agrees     Progress: improving  Outcome Evaluation: Pt stated his appetite was good and he stated he does not sleep. Pt rated anxiety and depression a 10/10. Pt stated he was suicidal and homicidal but has no plans to kill himself or anyone else. Pt stated he just wants to die and ask staff if they had any poison to kill him. Pt was irratable and was out for snacks and back to bed to rest.

## 2023-05-01 NOTE — PROGRESS NOTES
"1335:     Patient asked to speak with therapist this date.  Patient reports that he has a SSD interview tomorrow via Zoom that he would like help to facilitate tomorrow.  Therapist was agreeable to help patient and Dr. Gregory was made aware.      Patient was encouraged to begin working on his aftercare plan. Patient refused and stated that he does not not want to be \"over loaded with stress\" and that he has to do the SSD meeting first and then can talk about it.  Patient reports that he is considering [facility] but would not sign consent to begin referral.   "

## 2023-05-01 NOTE — NURSING NOTE
"PT refused to give social security number. Explained that the doctor's office needed it.  Stated \"go ahead and steal my identity like everyone else\".   "

## 2023-05-01 NOTE — NURSING NOTE
Received referral form from [facility]. Will pass in report to have completed, and signed by Dr. Gregory due to the PT's insurance.

## 2023-05-02 PROCEDURE — 99232 SBSQ HOSP IP/OBS MODERATE 35: CPT | Performed by: PSYCHIATRY & NEUROLOGY

## 2023-05-02 RX ORDER — DIVALPROEX SODIUM 500 MG/1
1000 TABLET, EXTENDED RELEASE ORAL NIGHTLY
Status: DISCONTINUED | OUTPATIENT
Start: 2023-05-02 | End: 2023-05-04 | Stop reason: HOSPADM

## 2023-05-02 RX ADMIN — MIRTAZAPINE 15 MG: 15 TABLET, FILM COATED ORAL at 20:17

## 2023-05-02 RX ADMIN — HYDROXYZINE HYDROCHLORIDE 50 MG: 50 TABLET ORAL at 20:18

## 2023-05-02 RX ADMIN — LURASIDONE HYDROCHLORIDE 20 MG: 40 TABLET, FILM COATED ORAL at 08:32

## 2023-05-02 RX ADMIN — DIVALPROEX SODIUM 1000 MG: 500 TABLET, EXTENDED RELEASE ORAL at 20:17

## 2023-05-02 NOTE — PLAN OF CARE
Problem: Adult Behavioral Health Plan of Care  Goal: Optimized Coping Skills in Response to Life Stressors  Outcome: Ongoing, Progressing  Intervention: Promote Effective Coping Strategies  Flowsheets (Taken 5/2/2023 0835 by Justin Vazquez, RN)  Supportive Measures:  • active listening utilized  • positive reinforcement provided  • verbalization of feelings encouraged  Goal: Develops/Participates in Therapeutic [place] to Support Successful Transition  Outcome: Ongoing, Progressing  Intervention: Foster Therapeutic [place]  Flowsheets (Taken 5/2/2023 0835 by Justin Vazquez, RN)  Trust Relationship/Rapport:  • care explained  • choices provided  • empathic listening provided  • emotional support provided  • questions answered  • questions encouraged  • reassurance provided  • thoughts/feelings acknowledged  Intervention: Mutually Develop Transition Plan  Flowsheets (Taken 4/28/2023 1436)  Transition Support:  • community resources reviewed  • crisis management plan verbalized  • follow-up care coordinated  • crisis management plan promoted  • follow-up care discussed      0930:     DATA:    Therapist met with the patient individually in the office, continued reviewing plan of care and aftercare plan.  The patient was agreeable. Staffed case with Dr. Gregory.    ASSESSMENT:    Patient was seen for follow up of  Bipolar 2 disorder, major depressive episode. Suicidal ideations. Homicidal ideations. PTSD (post-traumatic stress disorder.  Methamphetamine use disorder severe    Today, patient was seen 1-1 in the office with Dr. Gregory.  Patient calm/cooperative and oriented x 4.  Patient asks to complete SSD interview via zoom at 1115 this date. Dr. Gregory wrote order and therapist will help facilitate.     Patient endorses death wishes.  Patient endorses HI, denies specific intent.    Patient rates depression/anxiety 10/10.   Patient reports that he has a lot on his mind and therapist would not understand. Therapist  "continues to recommend patient open up to treatment team and work on treatment and aftercare plan. Patient continues to refuse to allow therapist to refer to aftercare as of yet. Patient appears irritable when this is brought up. Patient has been recommended to seek residential [facility] treatment.    1140    Therapist facilitated patients interview between patient and psychologist for SSD interview this date. Patient appeared irritable through out the interview. For example, when asked during the interview \"How do you spend your day?\" Patient responded, \"I don't know I masturbate, play with myself. What do you do?\"  The psychologist recommended for patient to have another interview after discharge due to his mental distress and hospitalization.  Patient then went on to threaten on suicide to the doctor.  The doctor reassured him that he would try and set up a future interview once more stable. Then patient appeared to lash out at therapist stating that it was because the therapist was typing that he got rescheduled. Therapist redirected and informed patient it was the right thing for him to tell the truth to the doctor and that he had let them know that he was in a psych unit under supervision. Encouraged patient to focus on his wellbeing and to consider a stable discharge so that SSD could set up future interviews.  Patient appears to struggle with taking accountability.      CLINICAL MANEUVERING:    Therapist provided safe, secure environment for patient to share.  Provided reflective listening and psychoeducation.  Assisted patient in processing the above session. Assisted patient in identifying any questions or needs to be addressed today.        Concern was voiced regarding substance use and educated patient on risks associated with use. Patient was advised to abstain from use and educated on community resources that can help with sobriety and recovery.         Plan:     Patient to remain hospitalized this date.    "   Treatment team will focus efforts on stabilizing patient's acute symptoms while providing education on healthy coping and crisis management to reduce hospitalizations.   Patient requires daily psychiatrist evaluation and 24/7 nursing supervision to promote patient  safety.     Therapist will offer 1-4 individual sessions, family education, and appropriate referral.     Therapist recommends residential [facility] referral.  Patient refuses to sign consent this date.

## 2023-05-02 NOTE — PLAN OF CARE
Problem: Adult Behavioral Health Plan of Care  Goal: Plan of Care Review  Outcome: Ongoing, Progressing  Flowsheets  Taken 5/2/2023 1333 by Justin Vazquez, RN  Outcome Evaluation: PATIENT VERBALIZES SEVERE ANXIETY AND DEPRESSION AS ONLY PROBLEMS THIS SHIFT. NO CRAVING OR S/S OF WITHDRAWAL. PATIENT IS AOX3, COOPERATIVE WITH NO S/S OF ACUTE DISTRESS NOTED. NEW ORDERS: RONAK TEST, DEPAKOTE 1000MG  Taken 5/2/2023 0835 by Justin Vazquez, RN  Plan of Care Reviewed With: patient  Patient Agreement with Plan of Care: agrees  Taken 5/2/2023 0505 by Sweetie Wilson, RN  Progress: improving   Goal Outcome Evaluation:  Plan of Care Reviewed With: patient  Patient Agreement with Plan of Care: agrees        Outcome Evaluation: PATIENT VERBALIZES SEVERE ANXIETY AND DEPRESSION AS ONLY PROBLEMS THIS SHIFT. NO CRAVING OR S/S OF WITHDRAWAL. PATIENT IS AOX3, COOPERATIVE WITH NO S/S OF ACUTE DISTRESS NOTED. NEW ORDERS: RONAK TEST, DEPAKOTE 1000MG

## 2023-05-02 NOTE — PROGRESS NOTES
"INPATIENT PSYCHIATRIC PROGRESS NOTE    Name:  Alexis Guthrie  :  1980  MRN:  [id number]  Visit Number:  [id number]  Length of stay:  5    SUBJECTIVE    CC/Focus of Exam: bipolar disorder    INTERVAL HISTORY:  The patient reports he is feeling better today and is hoping to have his disability appointment via Zoom today.  He denies active suicidal or homicidal ideations but has ongoing death wishes.  He reports compliance with his medications and no adverse effects.    Depression rating 10/10  Anxiety rating 10/10  Sleep: good  Withdrawal sx: denies  Cravin/10    Review of Systems   Respiratory: Negative.    Cardiovascular: Negative.    Musculoskeletal: Positive for arthralgias.   Psychiatric/Behavioral: Positive for dysphoric mood and suicidal ideas.       OBJECTIVE    Temp:  [97.2 °F (36.2 °C)-98.4 °F (36.9 °C)] 97.2 °F (36.2 °C)  Heart Rate:  [90-98] 90  Resp:  [18] 18  BP: (104-116)/(73) 104/73    MENTAL STATUS EXAM:  Appearance:Casually dressed, good hygeine.   Cooperation:Cooperative  Psychomotor: No psychomotor agitation/retardation, No EPS, No motor tics  Speech-normal rate, amount.  Mood \"irritable\"   Affect-congruent, appropriate, stable  Thought Content-goal directed, no delusional material present  Thought process-linear, organized.  Suicidality: Death wish  Homicidality: No HI  Perception: No AH/VH  Insight-fair   Judgement-fair    Lab Results (last 24 hours)     Procedure Component Value Units Date/Time    Valproic Acid Level, Total [[id number]]  (Abnormal) Collected: 23 1141    Specimen: Blood Updated: 23 1234     Valproic Acid 36.9 mcg/mL     Narrative:      Therapeutic Ranges for Valproic Acid    Epilepsy:       mcg/ml  Bipolar/Kaylen  up to 125 mcg/ml               Imaging Results (Last 24 Hours)     ** No results found for the last 24 hours. **             ECG/EMG Results (most recent)     Procedure Component Value Units Date/Time    ECG 12 Lead Other [[id number]] Collected: " 04/27/23 1731     Updated: 04/27/23 1754     QT Interval 386 ms      QTC Interval 436 ms     Narrative:      Test Reason : Baseline Cardiac Status~  Blood Pressure :   */*   mmHG  Vent. Rate :  77 BPM     Atrial Rate :  77 BPM     P-R Int : 116 ms          QRS Dur : 102 ms      QT Int : 386 ms       P-R-T Axes :  66  73  84 degrees     QTc Int : 436 ms    Normal sinus rhythm  Normal ECG  When compared with ECG of 27-DEC-2022 04:20,  Nonspecific T wave abnormality now evident in Lateral leads  Confirmed by Eusebio Montgomery ([id number]) on 4/27/2023 5:53:54 PM    Referred By: EV           Confirmed By: Eusebio Montgomery           ALLERGIES: Patient has no known allergies.    Medication Review:   Scheduled Medications:  divalproex, 500 mg, Oral, Nightly  lurasidone, 20 mg, Oral, Daily  mirtazapine, 15 mg, Oral, Nightly  nicotine, 1 patch, Transdermal, Q24H         PRN Medications:  •  acetaminophen  •  aluminum-magnesium hydroxide-simethicone  •  benzonatate  •  benztropine **OR** benztropine  •  famotidine  •  hydrOXYzine  •  ibuprofen  •  loperamide  •  lubrisoft  •  magnesium hydroxide  •  ondansetron  •  sodium chloride   All medications reviewed.    ASSESSMENT & PLAN:      Bipolar 2 disorder, major depressive episode  -Increase Depakote ER 1000 mg po hs, level on 5/1/2023 is 36.9.  -Continue Latuda 20 mg daily  -Continue Remeron 15 mg bed time       Suicidal ideations  -SP3       Homicidal ideations  -Patient reports thoughts of harming some people but wouldn't describe who. Will continue to monitor. His ongoing methamphetamine use may be contributing to paranoia.        PTSD (post-traumatic stress disorder)  -Medications as above  -Outpatient referral for psychotherapy       Hepatitis C  -Patient reports no previous treatments  -Refer to outpatient treatment        Methamphetamine use disorder severe  -Supportive treatment       Nicotine use disorder  -Nicotine replacement      Dry skin  -Lubrisoft lotion      Left ankle  pain  -Outpatient podiatry referral    Special precautions: Special Precautions Level 3 (q15 min checks) .    Behavioral Health Treatment Plan and Problem List: I have reviewed and approved the Behavioral Health Treatment Plan and Problem list.  The patient has had a chance to review and agrees with the treatment plan.    Copied text in portions of this note has been reviewed and is accurate as of 05/02/23         Clinician:  Nuha Gregory MD  05/02/23  10:38 EDT

## 2023-05-02 NOTE — PLAN OF CARE
Goal Outcome Evaluation:  Plan of Care Reviewed With: patient  Patient Agreement with Plan of Care: agrees     Progress: improving  Outcome Evaluation: Pt is calm and cooperative with no issues or concerns.

## 2023-05-02 NOTE — NURSING NOTE
FAXED THE SIGNED, BY DR. NICOLE,  FORM TO [facility]. NO REPLY AT THIS TIME

## 2023-05-03 PROCEDURE — 99232 SBSQ HOSP IP/OBS MODERATE 35: CPT | Performed by: PSYCHIATRY & NEUROLOGY

## 2023-05-03 RX ADMIN — DIVALPROEX SODIUM 1000 MG: 500 TABLET, EXTENDED RELEASE ORAL at 20:45

## 2023-05-03 RX ADMIN — HYDROXYZINE HYDROCHLORIDE 50 MG: 50 TABLET ORAL at 17:05

## 2023-05-03 RX ADMIN — MIRTAZAPINE 15 MG: 15 TABLET, FILM COATED ORAL at 20:45

## 2023-05-03 RX ADMIN — HYDROXYZINE HYDROCHLORIDE 50 MG: 50 TABLET ORAL at 09:03

## 2023-05-03 RX ADMIN — Medication: at 17:03

## 2023-05-03 RX ADMIN — LURASIDONE HYDROCHLORIDE 20 MG: 40 TABLET, FILM COATED ORAL at 09:02

## 2023-05-03 NOTE — PLAN OF CARE
Problem: Adult Behavioral Health Plan of Care  Goal: Patient-Specific Goal (Individualization)  Outcome: Ongoing, Progressing  Flowsheets (Taken 4/28/2023 1430)  Patient Personal Strengths:  • resilient  • resourceful  • spiritual/Baptism support       0945:     DATA:     Therapist met with the patient individually in the office, continued reviewing plan of care and aftercare plan.  The patient was agreeable. Staffed case with Dr. Gregory.     ASSESSMENT:     Patient was seen for follow up of  Bipolar 2 disorder, major depressive episode. Suicidal ideations. Homicidal ideations. PTSD (post-traumatic stress disorder.  Methamphetamine use disorder severe     Today, patient was seen 1-1 with Dr. Gregory at bedside.  Patient noted to appear irritable at first, but ultimately began to open up and stated that he is now thinking about going to rehab.  Patient denies SI/HI/AVH.  He rates depression/anxiety at 8/10.  Patient appears future oriented this date, no distress noted.         CLINICAL MANEUVERING:     Therapist provided safe, secure environment for patient to share.  Provided reflective listening and psychoeducation.  Assisted patient in processing the above session. Assisted patient in identifying any questions or needs to be addressed today.         Concern was voiced regarding substance use and educated patient on risks associated with use. Patient was advised to abstain from use and educated on community resources that can help with sobriety and recovery.           Plan:      Patient to remain hospitalized this date.      Treatment team will focus efforts on stabilizing patient's acute symptoms while providing education on healthy coping and crisis management to reduce hospitalizations.   Patient requires daily psychiatrist evaluation and 24/7 nursing supervision to promote patient  safety.     Therapist will offer 1-4 individual sessions, family education, and appropriate referral.     Therapist  recommends residential [facility] referral.Patient signed consent for [facility] [place], [facility].

## 2023-05-03 NOTE — PROGRESS NOTES
"INPATIENT PSYCHIATRIC PROGRESS NOTE    Name:  Alexis Guthrie  :  1980  MRN:  [id number]  Visit Number:  [id number]  Length of stay:  6    SUBJECTIVE    CC/Focus of Exam: bipolar disorder    INTERVAL HISTORY:  The patient reports he is considering going to rehab treatment for all the drugs he has been using. Reports mood to be some better. Denies any suicidal thoughts. Reports no adverse effects of the medications.   Depression rating 8/10  Anxiety rating 8/10  Sleep: good  Withdrawal sx: denies  Cravin/10    Review of Systems   Respiratory: Negative.    Cardiovascular: Negative.    Musculoskeletal: Positive for arthralgias.   Psychiatric/Behavioral: Positive for dysphoric mood.       OBJECTIVE    Temp:  [98.4 °F (36.9 °C)] 98.4 °F (36.9 °C)  Heart Rate:  [100] 100  Resp:  [18] 18  BP: (103)/(67) 103/67    MENTAL STATUS EXAM:  Appearance:Casually dressed, good hygeine.   Cooperation:Cooperative  Psychomotor: No psychomotor agitation/retardation, No EPS, No motor tics  Speech-normal rate, amount.  Mood \"irritable\"   Affect-congruent, appropriate, stable  Thought Content-goal directed, no delusional material present  Thought process-linear, organized.  Suicidality: No SI  Homicidality: No HI  Perception: No AH/VH  Insight-fair   Judgement-fair    Lab Results (last 24 hours)     ** No results found for the last 24 hours. **             Imaging Results (Last 24 Hours)     ** No results found for the last 24 hours. **             ECG/EMG Results (most recent)     Procedure Component Value Units Date/Time    ECG 12 Lead Other [[id number]] Collected: 23     Updated: 23     QT Interval 386 ms      QTC Interval 436 ms     Narrative:      Test Reason : Baseline Cardiac Status~  Blood Pressure :   */*   mmHG  Vent. Rate :  77 BPM     Atrial Rate :  77 BPM     P-R Int : 116 ms          QRS Dur : 102 ms      QT Int : 386 ms       P-R-T Axes :  66  73  84 degrees     QTc Int : 436 ms    Normal sinus " rhythm  Normal ECG  When compared with ECG of 27-DEC-2022 04:20,  Nonspecific T wave abnormality now evident in Lateral leads  Confirmed by Eusebio Montgomery ([id number]) on 4/27/2023 5:53:54 PM    Referred By: EV           Confirmed By: Eusebio Montgomery           ALLERGIES: Patient has no known allergies.    Medication Review:   Scheduled Medications:  divalproex, 1,000 mg, Oral, Nightly  lurasidone, 20 mg, Oral, Daily  mirtazapine, 15 mg, Oral, Nightly  nicotine, 1 patch, Transdermal, Q24H         PRN Medications:  •  acetaminophen  •  aluminum-magnesium hydroxide-simethicone  •  benzonatate  •  benztropine **OR** benztropine  •  famotidine  •  hydrOXYzine  •  ibuprofen  •  loperamide  •  lubrisoft  •  magnesium hydroxide  •  ondansetron  •  sodium chloride   All medications reviewed.    ASSESSMENT & PLAN:      Bipolar 2 disorder, major depressive episode  -Increased Depakote ER 1000 mg po hs on 5/2/23, level on 5/1/2023 is 36.9.  -Continue Latuda 20 mg daily  -Continue Remeron 15 mg bed time       Suicidal ideations  -SP3       Homicidal ideations  -Patient reports thoughts of harming some people but wouldn't describe who. Will continue to monitor. His ongoing methamphetamine use may be contributing to paranoia.        PTSD (post-traumatic stress disorder)  -Medications as above  -Outpatient referral for psychotherapy       Hepatitis C  -Patient reports no previous treatments  -Refer to outpatient treatment        Methamphetamine use disorder severe  -Supportive treatment       Nicotine use disorder  -Nicotine replacement      Dry skin  -Lubrisoft lotion      Left ankle pain  -Outpatient podiatry referral    Special precautions: Special Precautions Level 3 (q15 min checks) .    Behavioral Health Treatment Plan and Problem List: I have reviewed and approved the Behavioral Health Treatment Plan and Problem list.  The patient has had a chance to review and agrees with the treatment plan.    Copied text in portions of this  note has been reviewed and is accurate as of 05/03/23         Clinician:  Nuha Gregory MD  05/03/23  09:43 EDT

## 2023-05-03 NOTE — PLAN OF CARE
Problem: Adult Behavioral Health Plan of Care  Goal: Plan of Care Review  Outcome: Ongoing, Progressing  Flowsheets  Taken 5/3/2023 1204  Progress: no change  Outcome Evaluation: PATIENT VERBALIZES MODERATE ANXIETY AND DEPRESSION AS ONLY PROBLEMS THIS SHIFT. PATIENT IS AOX3, MINIMALLY COOPERATIVE AND APPEARS ANGRY. NO S/S OF ACUTE DISTRESS NOTED. NNO NOTED.  Taken 5/3/2023 0722  Plan of Care Reviewed With: patient  Patient Agreement with Plan of Care: agrees   Goal Outcome Evaluation:  Plan of Care Reviewed With: patient  Patient Agreement with Plan of Care: agrees     Progress: no change  Outcome Evaluation: PATIENT VERBALIZES MODERATE ANXIETY AND DEPRESSION AS ONLY PROBLEMS THIS SHIFT. PATIENT IS AOX3, MINIMALLY COOPERATIVE AND APPEARS ANGRY. NO S/S OF ACUTE DISTRESS NOTED. NNO NOTED.

## 2023-05-03 NOTE — PROGRESS NOTES
Navigator is helping with the following referrals:    [facility] - [phone]  -Sent 5/3  -[facility] screened patient and he is approved. Bed available tomorrow at [place] location.  11am-12pm.   5/3    [facility] - [phone]  -Does not accept patients insurance. 5/3    [facility] - [phone]
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
## 2023-05-03 NOTE — PLAN OF CARE
Goal Outcome Evaluation:  Plan of Care Reviewed With: patient  Patient Agreement with Plan of Care: agrees     Progress: no change  Outcome Evaluation: Pt still remains irratable with staff and isolates to his room. Pt was asleep for the majoirty of the shift and did not participate in the assessment.

## 2023-05-04 VITALS
BODY MASS INDEX: 22.08 KG/M2 | HEART RATE: 78 BPM | WEIGHT: 163 LBS | RESPIRATION RATE: 18 BRPM | OXYGEN SATURATION: 96 % | SYSTOLIC BLOOD PRESSURE: 99 MMHG | DIASTOLIC BLOOD PRESSURE: 62 MMHG | HEIGHT: 72 IN | TEMPERATURE: 98.2 F

## 2023-05-04 PROBLEM — F60.2 ANTISOCIAL PERSONALITY DISORDER: Status: ACTIVE | Noted: 2023-05-04

## 2023-05-04 PROBLEM — F60.89 CLUSTER B PERSONALITY DISORDER: Status: ACTIVE | Noted: 2023-05-04

## 2023-05-04 PROCEDURE — 99239 HOSP IP/OBS DSCHRG MGMT >30: CPT | Performed by: PSYCHIATRY & NEUROLOGY

## 2023-05-04 RX ORDER — LURASIDONE HYDROCHLORIDE 20 MG/1
20 TABLET, FILM COATED ORAL DAILY
Qty: 30 TABLET | Refills: 0 | Status: SHIPPED | OUTPATIENT
Start: 2023-05-05

## 2023-05-04 RX ORDER — MIRTAZAPINE 15 MG/1
15 TABLET, FILM COATED ORAL NIGHTLY
Qty: 30 TABLET | Refills: 0 | Status: SHIPPED | OUTPATIENT
Start: 2023-05-04

## 2023-05-04 RX ORDER — DIVALPROEX SODIUM 500 MG/1
1000 TABLET, EXTENDED RELEASE ORAL NIGHTLY
Qty: 60 TABLET | Refills: 0 | Status: SHIPPED | OUTPATIENT
Start: 2023-05-04

## 2023-05-04 RX ADMIN — LURASIDONE HYDROCHLORIDE 20 MG: 40 TABLET, FILM COATED ORAL at 08:29

## 2023-05-04 NOTE — DISCHARGE SUMMARY
":  1980  MRN:  [id number]  Visit Number:  [id number]      Date of Admission:2023   Date of Discharge:  2023    Discharge Diagnosis:  Principal Problem:    Bipolar 2 disorder, major depressive episode  Active Problems:    Hepatitis C    PTSD (post-traumatic stress disorder)    Antisocial personality disorder    Methamphetamine use disorder severe      Admission Diagnosis:  MDD (major depressive disorder) [F32.9]     HPI  Alexis Guthrie is a 42 y.o. male who was admitted on 2023 with complaints of suicidal thoughts and a plan to cut his throat  For details please see H&P dated 23.    Hospital Course  Patient is a 42 y.o. male presented with suicidal ideations with a plan to cut his throat. The patient was admitted to the adult psych unit for safety, further evaluation and treatment. The patient was started on Depakote ER, Latuda and Remeron. He continued to exhibit mood lability and anger and at times verbalized thoughts of harming others but no one in particular. He would act out if things didn't go his way. Appeared to have limited insight or empathy and blamed others for his problems and was reluctant to take responsibility for his actions and consequences. He was worried about his disability evaluation and was able to do it via Zoom and was angry and inappropriate towards the evaluator. He eventually calmed down and was agreeable to go to rehab treatment for his ongoing methamphetamine use.      Mental Status Exam upon discharge:   Mood \"alright\"   Affect-congruent, appropriate, stable  Thought Content-goal directed, no delusional material present  Thought process-linear, organized.  Suicidality: No SI  Homicidality: No HI  Perception: No AH/VH    Procedures Performed         Consults:   Consults     No orders found from 3/29/2023 to 2023.          Pertinent Test Results:   Admission on 2023   Component Date Value Ref Range Status   • QT Interval 2023 386  ms Final   • QTC " Interval 04/27/2023 436  ms Final   • Valproic Acid 05/01/2023 36.9 (L)  50.0 - 125.0 mcg/mL Final   Admission on 04/27/2023, Discharged on 04/27/2023   Component Date Value Ref Range Status   • COVID19 04/27/2023 Not Detected  Not Detected - Ref. Range Final   • Influenza A PCR 04/27/2023 Not Detected  Not Detected Final   • Influenza B PCR 04/27/2023 Not Detected  Not Detected Final   • Glucose 04/27/2023 143 (H)  65 - 99 mg/dL Final   • BUN 04/27/2023 9  6 - 20 mg/dL Final   • Creatinine 04/27/2023 0.97  0.76 - 1.27 mg/dL Final   • Sodium 04/27/2023 141  136 - 145 mmol/L Final   • Potassium 04/27/2023 3.8  3.5 - 5.2 mmol/L Final    Slight hemolysis detected by analyzer. Results may be affected.   • Chloride 04/27/2023 104  98 - 107 mmol/L Final   • CO2 04/27/2023 27.4  22.0 - 29.0 mmol/L Final   • Calcium 04/27/2023 8.7  8.6 - 10.5 mg/dL Final   • Total Protein 04/27/2023 6.7  6.0 - 8.5 g/dL Final   • Albumin 04/27/2023 3.8  3.5 - 5.2 g/dL Final   • ALT (SGPT) 04/27/2023 29  1 - 41 U/L Final   • AST (SGOT) 04/27/2023 21  1 - 40 U/L Final   • Alkaline Phosphatase 04/27/2023 79  39 - 117 U/L Final   • Total Bilirubin 04/27/2023 0.2  0.0 - 1.2 mg/dL Final   • Globulin 04/27/2023 2.9  gm/dL Final   • A/G Ratio 04/27/2023 1.3  g/dL Final   • BUN/Creatinine Ratio 04/27/2023 9.3  7.0 - 25.0 Final   • Anion Gap 04/27/2023 9.6  5.0 - 15.0 mmol/L Final   • eGFR 04/27/2023 99.3  >60.0 mL/min/1.73 Final   • Color, UA 04/27/2023 Yellow  Yellow, Straw Final   • Appearance, UA 04/27/2023 Clear  Clear Final   • pH, UA 04/27/2023 7.5  5.0 - 8.0 Final   • Specific Gravity, UA 04/27/2023 1.011  1.005 - 1.030 Final   • Glucose, UA 04/27/2023 Negative  Negative Final   • Ketones, UA 04/27/2023 Negative  Negative Final   • Bilirubin, UA 04/27/2023 Negative  Negative Final   • Blood, UA 04/27/2023 Negative  Negative Final   • Protein, UA 04/27/2023 Negative  Negative Final   • Leuk Esterase, UA 04/27/2023 Negative  Negative Final   •  Nitrite, UA 04/27/2023 Negative  Negative Final   • Urobilinogen, UA 04/27/2023 0.2 E.U./dL  0.2 - 1.0 E.U./dL Final   • Ethanol 04/27/2023 <10  0 - 10 mg/dL Final   • Ethanol % 04/27/2023 <0.010  % Final   • Magnesium 04/27/2023 2.0  1.6 - 2.6 mg/dL Final   • WBC 04/27/2023 10.04  3.40 - 10.80 10*3/mm3 Final   • RBC 04/27/2023 5.11  4.14 - 5.80 10*6/mm3 Final   • Hemoglobin 04/27/2023 14.9  13.0 - 17.7 g/dL Final   • Hematocrit 04/27/2023 46.4  37.5 - 51.0 % Final   • MCV 04/27/2023 90.8  79.0 - 97.0 fL Final   • MCH 04/27/2023 29.2  26.6 - 33.0 pg Final   • MCHC 04/27/2023 32.1  31.5 - 35.7 g/dL Final   • RDW 04/27/2023 13.7  12.3 - 15.4 % Final   • RDW-SD 04/27/2023 46.0  37.0 - 54.0 fl Final   • MPV 04/27/2023 8.7  6.0 - 12.0 fL Final   • Platelets 04/27/2023 310  140 - 450 10*3/mm3 Final   • Neutrophil % 04/27/2023 56.2  42.7 - 76.0 % Final   • Lymphocyte % 04/27/2023 34.3  19.6 - 45.3 % Final   • Monocyte % 04/27/2023 7.0  5.0 - 12.0 % Final   • Eosinophil % 04/27/2023 1.7  0.3 - 6.2 % Final   • Basophil % 04/27/2023 0.5  0.0 - 1.5 % Final   • Immature Grans % 04/27/2023 0.3  0.0 - 0.5 % Final   • Neutrophils, Absolute 04/27/2023 5.65  1.70 - 7.00 10*3/mm3 Final   • Lymphocytes, Absolute 04/27/2023 3.44 (H)  0.70 - 3.10 10*3/mm3 Final   • Monocytes, Absolute 04/27/2023 0.70  0.10 - 0.90 10*3/mm3 Final   • Eosinophils, Absolute 04/27/2023 0.17  0.00 - 0.40 10*3/mm3 Final   • Basophils, Absolute 04/27/2023 0.05  0.00 - 0.20 10*3/mm3 Final   • Immature Grans, Absolute 04/27/2023 0.03  0.00 - 0.05 10*3/mm3 Final   • nRBC 04/27/2023 0.0  0.0 - 0.2 /100 WBC Final   • THC, Screen, Urine 04/27/2023 Negative  Negative Final   • Phencyclidine (PCP), Urine 04/27/2023 Negative  Negative Final   • Cocaine Screen, Urine 04/27/2023 Negative  Negative Final   • Methamphetamine, Ur 04/27/2023 Positive (A)  Negative Final   • Opiate Screen 04/27/2023 Negative  Negative Final   • Amphetamine Screen, Urine 04/27/2023 Positive (A)   Negative Final   • Benzodiazepine Screen, Urine 04/27/2023 Negative  Negative Final   • Tricyclic Antidepressants Screen 04/27/2023 Negative  Negative Final   • Methadone Screen, Urine 04/27/2023 Negative  Negative Final   • Barbiturates Screen, Urine 04/27/2023 Negative  Negative Final   • Oxycodone Screen, Urine 04/27/2023 Negative  Negative Final   • Propoxyphene Screen 04/27/2023 Negative  Negative Final   • Buprenorphine, Screen, Urine 04/27/2023 Negative  Negative Final        Condition on Discharge:  improved    Vital Signs  Temp:  [98 °F (36.7 °C)-98.2 °F (36.8 °C)] 98.2 °F (36.8 °C)  Heart Rate:  [78-91] 78  Resp:  [18] 18  BP: ()/(62-69) 99/62      Discharge Disposition:  Home or Self Care    Discharge Medications:     Discharge Medications      New Medications      Instructions Start Date   divalproex 500 MG 24 hr tablet  Commonly known as: DEPAKOTE ER   1,000 mg, Oral, Nightly      Lurasidone HCl 20 MG tablet tablet  Commonly known as: LATUDA   20 mg, Oral, Daily   Start Date: May 5, 2023     mirtazapine 15 MG tablet  Commonly known as: REMERON   15 mg, Oral, Nightly             Discharge Diet:    Diet Instructions    REGULAR DIET           Activity at Discharge:    Activity Instructions    AS TOLERATED           Follow-up Appointments      [facility] [id number], [place] [address] [phone]     5/4/2023       Time spent in discharge: > 30 min    Clinician:   Nuha Gregory MD  05/04/23  10:02 EDT

## 2023-05-04 NOTE — CASE MANAGEMENT/SOCIAL WORK
Case Management/Social Work    Patient Name:  Alexis Guthrie  YOB: 1980  MRN: [id number]  Admit Date:  4/27/2023    This therapist covering for primary therapist on this date. Patient is being discharged to [facility], transportation is scheduled for 11:00-12:00. Patient has been irritable and angry during admission, often acting out or making attempts to manipulate if he didn't get his way. At time of discharge, patient denied SI/HI/AVH.     Electronically signed by:  ROHAN Rey  05/04/23 10:12 EDT

## 2023-05-04 NOTE — PLAN OF CARE
Goal Outcome Evaluation:  Plan of Care Reviewed With: patient  Patient Agreement with Plan of Care: agrees     Progress: no change  Outcome Evaluation: Pt rates anxiety and depression 10/10. Pt reports having issues with sleep but is able to eat well. Pt states that he is having severe SI, says that if he had a way of killing himself he would do it this very minute in front of all of us. Pt says that he does not want to breathe or be alive any longer.

## 2023-05-05 NOTE — PROGRESS NOTES
Navigator is helping with the following referrals:     [facility] - [phone]  -Sent 5/3  -[facility] screened patient and he is approved. Bed available tomorrow at [place] location.  11am-12pm.   5/3  -Received call from [facility]. Patient left treatment AMA this morning.  5/5

## 2025-08-29 ENCOUNTER — HOSPITAL ENCOUNTER (EMERGENCY)
Facility: HOSPITAL | Age: 45
Discharge: OTHER INSTITUTION | End: 2025-08-29
Attending: STUDENT IN AN ORGANIZED HEALTH CARE EDUCATION/TRAINING PROGRAM
Payer: MEDICAID

## 2025-08-29 VITALS
TEMPERATURE: 98.3 F | OXYGEN SATURATION: 96 % | HEIGHT: 72 IN | DIASTOLIC BLOOD PRESSURE: 88 MMHG | SYSTOLIC BLOOD PRESSURE: 141 MMHG | BODY MASS INDEX: 24.38 KG/M2 | WEIGHT: 180 LBS | HEART RATE: 105 BPM | RESPIRATION RATE: 18 BRPM

## 2025-08-29 DIAGNOSIS — R45.850 HOMICIDAL IDEATIONS: ICD-10-CM

## 2025-08-29 DIAGNOSIS — R45.851 SUICIDAL IDEATIONS: Primary | ICD-10-CM

## 2025-08-29 LAB
ALBUMIN SERPL-MCNC: 4.4 G/DL (ref 3.5–5.2)
ALBUMIN/GLOB SERPL: 1.4 G/DL
ALP SERPL-CCNC: 61 U/L (ref 39–117)
ALT SERPL W P-5'-P-CCNC: 79 U/L (ref 1–41)
AMPHET+METHAMPHET UR QL: NEGATIVE
AMPHETAMINES UR QL: NEGATIVE
ANION GAP SERPL CALCULATED.3IONS-SCNC: 13 MMOL/L (ref 5–15)
APAP SERPL-MCNC: <5 MCG/ML (ref 0–30)
AST SERPL-CCNC: 41 U/L (ref 1–40)
BARBITURATES UR QL SCN: NEGATIVE
BASOPHILS # BLD AUTO: 0.02 10*3/MM3 (ref 0–0.2)
BASOPHILS NFR BLD AUTO: 0.2 % (ref 0–1.5)
BENZODIAZ UR QL SCN: NEGATIVE
BILIRUB SERPL-MCNC: 0.5 MG/DL (ref 0–1.2)
BILIRUB UR QL STRIP: NEGATIVE
BUN SERPL-MCNC: 18 MG/DL (ref 6–20)
BUN/CREAT SERPL: 16.4 (ref 7–25)
BUPRENORPHINE SERPL-MCNC: NEGATIVE NG/ML
CALCIUM SPEC-SCNC: 9.6 MG/DL (ref 8.6–10.5)
CANNABINOIDS SERPL QL: NEGATIVE
CHLORIDE SERPL-SCNC: 102 MMOL/L (ref 98–107)
CLARITY UR: CLEAR
CO2 SERPL-SCNC: 21 MMOL/L (ref 22–29)
COCAINE UR QL: NEGATIVE
COLOR UR: YELLOW
CREAT SERPL-MCNC: 1.1 MG/DL (ref 0.76–1.27)
DEPRECATED RDW RBC AUTO: 38.3 FL (ref 37–54)
EGFRCR SERPLBLD CKD-EPI 2021: 84.4 ML/MIN/1.73
EOSINOPHIL # BLD AUTO: 0.01 10*3/MM3 (ref 0–0.4)
EOSINOPHIL NFR BLD AUTO: 0.1 % (ref 0.3–6.2)
ERYTHROCYTE [DISTWIDTH] IN BLOOD BY AUTOMATED COUNT: 12.1 % (ref 12.3–15.4)
FENTANYL UR-MCNC: NEGATIVE NG/ML
GLOBULIN UR ELPH-MCNC: 3.1 GM/DL
GLUCOSE SERPL-MCNC: 151 MG/DL (ref 65–99)
GLUCOSE UR STRIP-MCNC: NEGATIVE MG/DL
HCT VFR BLD AUTO: 46.6 % (ref 37.5–51)
HGB BLD-MCNC: 16.4 G/DL (ref 13–17.7)
HGB UR QL STRIP.AUTO: NEGATIVE
IMM GRANULOCYTES # BLD AUTO: 0.03 10*3/MM3 (ref 0–0.05)
IMM GRANULOCYTES NFR BLD AUTO: 0.3 % (ref 0–0.5)
KETONES UR QL STRIP: ABNORMAL
LEUKOCYTE ESTERASE UR QL STRIP.AUTO: NEGATIVE
LYMPHOCYTES # BLD AUTO: 1.19 10*3/MM3 (ref 0.7–3.1)
LYMPHOCYTES NFR BLD AUTO: 10.3 % (ref 19.6–45.3)
MAGNESIUM SERPL-MCNC: 2 MG/DL (ref 1.6–2.6)
MCH RBC QN AUTO: 30.4 PG (ref 26.6–33)
MCHC RBC AUTO-ENTMCNC: 35.2 G/DL (ref 31.5–35.7)
MCV RBC AUTO: 86.5 FL (ref 79–97)
METHADONE UR QL SCN: NEGATIVE
MONOCYTES # BLD AUTO: 0.71 10*3/MM3 (ref 0.1–0.9)
MONOCYTES NFR BLD AUTO: 6.1 % (ref 5–12)
NEUTROPHILS NFR BLD AUTO: 83 % (ref 42.7–76)
NEUTROPHILS NFR BLD AUTO: 9.61 10*3/MM3 (ref 1.7–7)
NITRITE UR QL STRIP: NEGATIVE
NRBC BLD AUTO-RTO: 0 /100 WBC (ref 0–0.2)
OPIATES UR QL: NEGATIVE
OXYCODONE UR QL SCN: NEGATIVE
PCP UR QL SCN: NEGATIVE
PH UR STRIP.AUTO: 6 [PH] (ref 5–8)
PLATELET # BLD AUTO: 295 10*3/MM3 (ref 140–450)
PMV BLD AUTO: 10.2 FL (ref 6–12)
POTASSIUM SERPL-SCNC: 3.8 MMOL/L (ref 3.5–5.2)
PROT SERPL-MCNC: 7.5 G/DL (ref 6–8.5)
PROT UR QL STRIP: NEGATIVE
RBC # BLD AUTO: 5.39 10*6/MM3 (ref 4.14–5.8)
SALICYLATES SERPL-MCNC: <0.3 MG/DL
SARS-COV-2 RNA RESP QL NAA+PROBE: NOT DETECTED
SODIUM SERPL-SCNC: 136 MMOL/L (ref 136–145)
SP GR UR STRIP: 1.01 (ref 1–1.03)
TRICYCLICS UR QL SCN: NEGATIVE
TSH SERPL DL<=0.05 MIU/L-ACNC: 1.86 UIU/ML (ref 0.27–4.2)
UROBILINOGEN UR QL STRIP: ABNORMAL
WBC NRBC COR # BLD AUTO: 11.57 10*3/MM3 (ref 3.4–10.8)

## 2025-08-29 PROCEDURE — 99285 EMERGENCY DEPT VISIT HI MDM: CPT | Performed by: STUDENT IN AN ORGANIZED HEALTH CARE EDUCATION/TRAINING PROGRAM

## 2025-08-29 PROCEDURE — 85025 COMPLETE CBC W/AUTO DIFF WBC: CPT

## 2025-08-29 PROCEDURE — 80143 DRUG ASSAY ACETAMINOPHEN: CPT

## 2025-08-29 PROCEDURE — 80307 DRUG TEST PRSMV CHEM ANLYZR: CPT

## 2025-08-29 PROCEDURE — 87635 SARS-COV-2 COVID-19 AMP PRB: CPT

## 2025-08-29 PROCEDURE — 84443 ASSAY THYROID STIM HORMONE: CPT

## 2025-08-29 PROCEDURE — 81003 URINALYSIS AUTO W/O SCOPE: CPT

## 2025-08-29 PROCEDURE — 93005 ELECTROCARDIOGRAM TRACING: CPT

## 2025-08-29 PROCEDURE — 80179 DRUG ASSAY SALICYLATE: CPT

## 2025-08-29 PROCEDURE — 83735 ASSAY OF MAGNESIUM: CPT

## 2025-08-29 PROCEDURE — 80053 COMPREHEN METABOLIC PANEL: CPT
